# Patient Record
Sex: MALE | Race: BLACK OR AFRICAN AMERICAN | NOT HISPANIC OR LATINO | Employment: OTHER | URBAN - METROPOLITAN AREA
[De-identification: names, ages, dates, MRNs, and addresses within clinical notes are randomized per-mention and may not be internally consistent; named-entity substitution may affect disease eponyms.]

---

## 2017-07-25 ENCOUNTER — HISTORICAL (OUTPATIENT)
Dept: LAB | Facility: HOSPITAL | Age: 32
End: 2017-07-25

## 2017-07-25 LAB
EST. AVERAGE GLUCOSE BLD GHB EST-MCNC: 171 MG/DL
HBA1C MFR BLD: 7.6 % (ref 4.2–6.3)

## 2017-10-31 ENCOUNTER — HISTORICAL (OUTPATIENT)
Dept: ADMINISTRATIVE | Facility: HOSPITAL | Age: 32
End: 2017-10-31

## 2017-10-31 ENCOUNTER — HISTORICAL (OUTPATIENT)
Dept: FAMILY MEDICINE | Facility: CLINIC | Age: 32
End: 2017-10-31

## 2017-10-31 LAB
ABS NEUT (OLG): 8.3 X10(3)/MCL (ref 2.1–9.2)
BASOPHILS # BLD AUTO: 0.05 X10(3)/MCL
BASOPHILS NFR BLD AUTO: 0 % (ref 0–1)
BUN SERPL-MCNC: 14 MG/DL (ref 7–18)
CALCIUM SERPL-MCNC: 9.4 MG/DL (ref 8.5–10.1)
CHLORIDE SERPL-SCNC: 101 MMOL/L (ref 98–107)
CHOLEST SERPL-MCNC: 200 MG/DL
CHOLEST/HDLC SERPL: 4.8 {RATIO} (ref 0–5)
CO2 SERPL-SCNC: 28 MMOL/L (ref 21–32)
CREAT SERPL-MCNC: 1 MG/DL (ref 0.6–1.3)
CREAT UR-MCNC: 181 MG/DL
EOSINOPHIL # BLD AUTO: 0.37 10*3/UL
EOSINOPHIL NFR BLD AUTO: 3 % (ref 0–5)
ERYTHROCYTE [DISTWIDTH] IN BLOOD BY AUTOMATED COUNT: 13.4 % (ref 11.5–14.5)
EST. AVERAGE GLUCOSE BLD GHB EST-MCNC: 160 MG/DL
GLUCOSE SERPL-MCNC: 139 MG/DL (ref 74–106)
HBA1C MFR BLD: 7.2 % (ref 4.2–6.3)
HCT VFR BLD AUTO: 42.4 % (ref 40–51)
HDLC SERPL-MCNC: 42 MG/DL
HGB BLD-MCNC: 14.2 GM/DL (ref 13.5–17.5)
IMM GRANULOCYTES # BLD AUTO: 0.03 10*3/UL
IMM GRANULOCYTES NFR BLD AUTO: 0 %
LDLC SERPL CALC-MCNC: 135 MG/DL (ref 0–130)
LYMPHOCYTES # BLD AUTO: 2.17 X10(3)/MCL
LYMPHOCYTES NFR BLD AUTO: 18 % (ref 15–40)
MCH RBC QN AUTO: 26.2 PG (ref 26–34)
MCHC RBC AUTO-ENTMCNC: 33.5 GM/DL (ref 31–37)
MCV RBC AUTO: 78.2 FL (ref 80–100)
MICROALBUMIN UR-MCNC: 107 MG/L (ref 0–19)
MICROALBUMIN/CREAT RATIO PNL UR: 59.1 MCG/MG CR (ref 0–29)
MONOCYTES # BLD AUTO: 0.81 X10(3)/MCL
MONOCYTES NFR BLD AUTO: 7 % (ref 4–12)
NEUTROPHILS # BLD AUTO: 8.3 X10(3)/MCL
NEUTROPHILS NFR BLD AUTO: 71 X10(3)/MCL
PLATELET # BLD AUTO: 418 X10(3)/MCL (ref 130–400)
PMV BLD AUTO: 10.6 FL (ref 7.4–10.4)
POTASSIUM SERPL-SCNC: 4.2 MMOL/L (ref 3.5–5.1)
RBC # BLD AUTO: 5.42 X10(6)/MCL (ref 4.5–5.9)
SODIUM SERPL-SCNC: 138 MMOL/L (ref 136–145)
TRIGL SERPL-MCNC: 114 MG/DL
VLDLC SERPL CALC-MCNC: 23 MG/DL
WBC # SPEC AUTO: 11.7 X10(3)/MCL (ref 4.5–11)

## 2017-11-06 ENCOUNTER — HISTORICAL (OUTPATIENT)
Dept: SLEEP MEDICINE | Facility: HOSPITAL | Age: 32
End: 2017-11-06

## 2017-12-17 ENCOUNTER — HISTORICAL (OUTPATIENT)
Dept: SLEEP MEDICINE | Facility: HOSPITAL | Age: 32
End: 2017-12-17

## 2018-03-15 ENCOUNTER — HISTORICAL (OUTPATIENT)
Dept: ADMINISTRATIVE | Facility: HOSPITAL | Age: 33
End: 2018-03-15

## 2018-03-15 LAB
ABS NEUT (OLG): 4.98 X10(3)/MCL (ref 2.1–9.2)
ALBUMIN SERPL-MCNC: 4.1 GM/DL (ref 3.4–5)
ALBUMIN/GLOB SERPL: 1 RATIO (ref 1–2)
ALP SERPL-CCNC: 62 UNIT/L (ref 45–117)
ALT SERPL-CCNC: 43 UNIT/L (ref 12–78)
APPEARANCE, UA: CLEAR
AST SERPL-CCNC: 17 UNIT/L (ref 15–37)
BACTERIA #/AREA URNS AUTO: ABNORMAL /[HPF]
BASOPHILS # BLD AUTO: 0.04 X10(3)/MCL
BASOPHILS NFR BLD AUTO: 0 %
BILIRUB SERPL-MCNC: 0.3 MG/DL (ref 0.2–1)
BILIRUB UR QL STRIP: NEGATIVE
BILIRUBIN DIRECT+TOT PNL SERPL-MCNC: 0.1 MG/DL
BILIRUBIN DIRECT+TOT PNL SERPL-MCNC: 0.2 MG/DL
BUN SERPL-MCNC: 15 MG/DL (ref 7–18)
CALCIUM SERPL-MCNC: 9.3 MG/DL (ref 8.5–10.1)
CHLORIDE SERPL-SCNC: 105 MMOL/L (ref 98–107)
CO2 SERPL-SCNC: 29 MMOL/L (ref 21–32)
COLOR UR: NORMAL
CREAT SERPL-MCNC: 0.9 MG/DL (ref 0.6–1.3)
CREAT UR-MCNC: 117 MG/DL
EOSINOPHIL # BLD AUTO: 0.41 10*3/UL
EOSINOPHIL NFR BLD AUTO: 5 %
ERYTHROCYTE [DISTWIDTH] IN BLOOD BY AUTOMATED COUNT: 14.4 % (ref 11.5–14.5)
EST. AVERAGE GLUCOSE BLD GHB EST-MCNC: 148 MG/DL
GLOBULIN SER-MCNC: 3.9 GM/ML (ref 2.3–3.5)
GLUCOSE (UA): NORMAL
GLUCOSE SERPL-MCNC: 115 MG/DL (ref 74–106)
HBA1C MFR BLD: 6.8 % (ref 4.2–6.3)
HCT VFR BLD AUTO: 45.4 % (ref 40–51)
HGB BLD-MCNC: 14.6 GM/DL (ref 13.5–17.5)
HGB UR QL STRIP: NEGATIVE
HYALINE CASTS #/AREA URNS LPF: ABNORMAL /[LPF]
IMM GRANULOCYTES # BLD AUTO: 0.03 10*3/UL
IMM GRANULOCYTES NFR BLD AUTO: 0 %
KETONES UR QL STRIP: NEGATIVE
LEUKOCYTE ESTERASE UR QL STRIP: NEGATIVE
LYMPHOCYTES # BLD AUTO: 2.34 X10(3)/MCL
LYMPHOCYTES NFR BLD AUTO: 28 % (ref 13–40)
MCH RBC QN AUTO: 26.2 PG (ref 26–34)
MCHC RBC AUTO-ENTMCNC: 32.2 GM/DL (ref 31–37)
MCV RBC AUTO: 81.5 FL (ref 80–100)
MICROALBUMIN UR-MCNC: 8 MG/L (ref 0–19)
MICROALBUMIN/CREAT RATIO PNL UR: 6.8 MCG/MG CR (ref 0–29)
MONOCYTES # BLD AUTO: 0.5 X10(3)/MCL
MONOCYTES NFR BLD AUTO: 6 % (ref 4–12)
NEUTROPHILS # BLD AUTO: 4.98 X10(3)/MCL
NEUTROPHILS NFR BLD AUTO: 60 X10(3)/MCL
NITRITE UR QL STRIP: NEGATIVE
PH UR STRIP: 5.5 [PH] (ref 4.5–8)
PLATELET # BLD AUTO: 382 X10(3)/MCL (ref 130–400)
PMV BLD AUTO: 11.1 FL (ref 7.4–10.4)
POTASSIUM SERPL-SCNC: 4.2 MMOL/L (ref 3.5–5.1)
PROT SERPL-MCNC: 8 GM/DL (ref 6.4–8.2)
PROT UR QL STRIP: NEGATIVE
RBC # BLD AUTO: 5.57 X10(6)/MCL (ref 4.5–5.9)
RBC #/AREA URNS AUTO: ABNORMAL /[HPF]
SODIUM SERPL-SCNC: 141 MMOL/L (ref 136–145)
SP GR UR STRIP: 1.01 (ref 1–1.03)
SQUAMOUS #/AREA URNS LPF: ABNORMAL /[LPF]
UROBILINOGEN UR STRIP-ACNC: NORMAL
WBC # SPEC AUTO: 8.3 X10(3)/MCL (ref 4.5–11)
WBC #/AREA URNS AUTO: ABNORMAL /HPF

## 2018-09-19 ENCOUNTER — HISTORICAL (OUTPATIENT)
Dept: ADMINISTRATIVE | Facility: HOSPITAL | Age: 33
End: 2018-09-19

## 2018-09-19 LAB
ALBUMIN SERPL-MCNC: 4 GM/DL (ref 3.4–5)
ALBUMIN/GLOB SERPL: 1 RATIO (ref 1–2)
ALP SERPL-CCNC: 59 UNIT/L (ref 45–117)
ALT SERPL-CCNC: 32 UNIT/L (ref 12–78)
AST SERPL-CCNC: 14 UNIT/L (ref 15–37)
BILIRUB SERPL-MCNC: 0.4 MG/DL (ref 0.2–1)
BILIRUBIN DIRECT+TOT PNL SERPL-MCNC: 0.1 MG/DL
BILIRUBIN DIRECT+TOT PNL SERPL-MCNC: 0.3 MG/DL
BUN SERPL-MCNC: 15 MG/DL (ref 7–18)
CALCIUM SERPL-MCNC: 9.4 MG/DL (ref 8.5–10.1)
CHLORIDE SERPL-SCNC: 102 MMOL/L (ref 98–107)
CHOLEST SERPL-MCNC: 210 MG/DL
CHOLEST/HDLC SERPL: 5 {RATIO} (ref 0–5)
CO2 SERPL-SCNC: 31 MMOL/L (ref 21–32)
CREAT SERPL-MCNC: 0.9 MG/DL (ref 0.6–1.3)
CREAT UR-MCNC: 132 MG/DL
EST. AVERAGE GLUCOSE BLD GHB EST-MCNC: 160 MG/DL
GLOBULIN SER-MCNC: 3.9 GM/ML (ref 2.3–3.5)
GLUCOSE SERPL-MCNC: 121 MG/DL (ref 74–106)
HBA1C MFR BLD: 7.2 % (ref 4.2–6.3)
HDLC SERPL-MCNC: 42 MG/DL
LDLC SERPL CALC-MCNC: 137 MG/DL (ref 0–130)
MICROALBUMIN UR-MCNC: 21 MG/L (ref 0–19)
MICROALBUMIN/CREAT RATIO PNL UR: 15.9 MCG/MG CR (ref 0–29)
POTASSIUM SERPL-SCNC: 4.2 MMOL/L (ref 3.5–5.1)
PROT SERPL-MCNC: 7.9 GM/DL (ref 6.4–8.2)
SODIUM SERPL-SCNC: 139 MMOL/L (ref 136–145)
TRIGL SERPL-MCNC: 155 MG/DL
VLDLC SERPL CALC-MCNC: 31 MG/DL

## 2019-09-26 ENCOUNTER — HISTORICAL (OUTPATIENT)
Dept: ADMINISTRATIVE | Facility: HOSPITAL | Age: 34
End: 2019-09-26

## 2019-09-26 LAB
ABS NEUT (OLG): 4.66 X10(3)/MCL (ref 2.1–9.2)
ALBUMIN SERPL-MCNC: 4.1 GM/DL (ref 3.4–5)
ALBUMIN/GLOB SERPL: 1 RATIO (ref 1.1–2)
ALP SERPL-CCNC: 70 UNIT/L (ref 45–117)
ALT SERPL-CCNC: 47 UNIT/L (ref 12–78)
AST SERPL-CCNC: 22 UNIT/L (ref 15–37)
BASOPHILS # BLD AUTO: 0 X10(3)/MCL (ref 0–0.2)
BASOPHILS NFR BLD AUTO: 0 %
BILIRUB SERPL-MCNC: 0.3 MG/DL (ref 0.2–1)
BILIRUBIN DIRECT+TOT PNL SERPL-MCNC: 0.1 MG/DL (ref 0–0.2)
BILIRUBIN DIRECT+TOT PNL SERPL-MCNC: 0.2 MG/DL
BUN SERPL-MCNC: 13 MG/DL (ref 7–18)
CALCIUM SERPL-MCNC: 9.3 MG/DL (ref 8.5–10.1)
CHLORIDE SERPL-SCNC: 102 MMOL/L (ref 98–107)
CHOLEST SERPL-MCNC: 183 MG/DL
CHOLEST/HDLC SERPL: 4.3 {RATIO} (ref 0–5)
CO2 SERPL-SCNC: 31 MMOL/L (ref 21–32)
CREAT SERPL-MCNC: 0.9 MG/DL (ref 0.6–1.3)
CREAT UR-MCNC: 152 MG/DL
EOSINOPHIL # BLD AUTO: 0.4 X10(3)/MCL (ref 0–0.9)
EOSINOPHIL NFR BLD AUTO: 5 %
ERYTHROCYTE [DISTWIDTH] IN BLOOD BY AUTOMATED COUNT: 12.9 % (ref 11.5–14.5)
GLOBULIN SER-MCNC: 4.1 GM/ML (ref 2.3–3.5)
GLUCOSE SERPL-MCNC: 133 MG/DL (ref 74–106)
HCT VFR BLD AUTO: 44.1 % (ref 40–51)
HDLC SERPL-MCNC: 43 MG/DL (ref 40–59)
HGB BLD-MCNC: 14.2 GM/DL (ref 13.5–17.5)
IMM GRANULOCYTES # BLD AUTO: 0.04 10*3/UL
IMM GRANULOCYTES NFR BLD AUTO: 0 %
LDLC SERPL CALC-MCNC: 113 MG/DL
LYMPHOCYTES # BLD AUTO: 2.7 X10(3)/MCL (ref 0.6–4.6)
LYMPHOCYTES NFR BLD AUTO: 32 %
MCH RBC QN AUTO: 26.4 PG (ref 26–34)
MCHC RBC AUTO-ENTMCNC: 32.2 GM/DL (ref 31–37)
MCV RBC AUTO: 82 FL (ref 80–100)
MICROALBUMIN UR-MCNC: 17.1 MG/L (ref 0–19)
MICROALBUMIN/CREAT RATIO PNL UR: 11.2 MCG/MG CR (ref 0–29)
MONOCYTES # BLD AUTO: 0.6 X10(3)/MCL (ref 0.1–1.3)
MONOCYTES NFR BLD AUTO: 7 %
NEUTROPHILS # BLD AUTO: 4.66 X10(3)/MCL (ref 2.1–9.2)
NEUTROPHILS NFR BLD AUTO: 55 %
PLATELET # BLD AUTO: 466 X10(3)/MCL (ref 130–400)
PMV BLD AUTO: 10.6 FL (ref 7.4–10.4)
POTASSIUM SERPL-SCNC: 4.1 MMOL/L (ref 3.5–5.1)
PROT SERPL-MCNC: 8.2 GM/DL (ref 6.4–8.2)
RBC # BLD AUTO: 5.38 X10(6)/MCL (ref 4.5–5.9)
SODIUM SERPL-SCNC: 138 MMOL/L (ref 136–145)
TRIGL SERPL-MCNC: 134 MG/DL
URATE SERPL-MCNC: 9.4 MG/DL (ref 3.5–7.2)
VLDLC SERPL CALC-MCNC: 27 MG/DL
WBC # SPEC AUTO: 8.5 X10(3)/MCL (ref 4.5–11)

## 2020-05-25 ENCOUNTER — HISTORICAL (OUTPATIENT)
Dept: ADMINISTRATIVE | Facility: HOSPITAL | Age: 35
End: 2020-05-25

## 2020-05-25 LAB
CHOLEST SERPL-MCNC: 168 MG/DL
CHOLEST/HDLC SERPL: 4.2 {RATIO} (ref 0–5)
CREAT UR-MCNC: 292 MG/DL
HDLC SERPL-MCNC: 40 MG/DL (ref 40–59)
LDLC SERPL CALC-MCNC: 103 MG/DL
MICROALBUMIN UR-MCNC: 23.2 MG/L (ref 0–19)
MICROALBUMIN/CREAT RATIO PNL UR: 7.9 MCG/MG CR (ref 0–29)
TRIGL SERPL-MCNC: 124 MG/DL
URATE SERPL-MCNC: 7.6 MG/DL (ref 3.5–7.2)
VLDLC SERPL CALC-MCNC: 25 MG/DL

## 2021-04-30 ENCOUNTER — HISTORICAL (OUTPATIENT)
Dept: ADMINISTRATIVE | Facility: HOSPITAL | Age: 36
End: 2021-04-30

## 2021-04-30 LAB
ABS NEUT (OLG): 4.93 X10(3)/MCL (ref 2.1–9.2)
ALBUMIN SERPL-MCNC: 4 GM/DL (ref 3.5–5)
ALBUMIN/GLOB SERPL: 1.1 RATIO (ref 1.1–2)
ALP SERPL-CCNC: 69 UNIT/L (ref 40–150)
ALT SERPL-CCNC: 23 UNIT/L (ref 0–55)
AST SERPL-CCNC: 15 UNIT/L (ref 5–34)
BASOPHILS # BLD AUTO: 0 X10(3)/MCL (ref 0–0.2)
BASOPHILS NFR BLD AUTO: 0 %
BILIRUB SERPL-MCNC: 0.7 MG/DL
BILIRUBIN DIRECT+TOT PNL SERPL-MCNC: 0.2 MG/DL (ref 0–0.5)
BILIRUBIN DIRECT+TOT PNL SERPL-MCNC: 0.5 MG/DL (ref 0–0.8)
BUN SERPL-MCNC: 12.1 MG/DL (ref 8.9–20.6)
CALCIUM SERPL-MCNC: 10.1 MG/DL (ref 8.4–10.2)
CHLORIDE SERPL-SCNC: 100 MMOL/L (ref 98–107)
CHOLEST SERPL-MCNC: 195 MG/DL
CHOLEST/HDLC SERPL: 5 {RATIO} (ref 0–5)
CO2 SERPL-SCNC: 28 MMOL/L (ref 22–29)
CREAT SERPL-MCNC: 0.84 MG/DL (ref 0.73–1.18)
CREAT UR-MCNC: 124.5 MG/DL (ref 58–161)
EOSINOPHIL # BLD AUTO: 0.3 X10(3)/MCL (ref 0–0.9)
EOSINOPHIL NFR BLD AUTO: 3 %
ERYTHROCYTE [DISTWIDTH] IN BLOOD BY AUTOMATED COUNT: 13.1 % (ref 11.5–14.5)
EST. AVERAGE GLUCOSE BLD GHB EST-MCNC: 211.6 MG/DL
GLOBULIN SER-MCNC: 3.6 GM/DL (ref 2.4–3.5)
GLUCOSE SERPL-MCNC: 220 MG/DL (ref 74–100)
HBA1C MFR BLD: 9 %
HCT VFR BLD AUTO: 41.8 % (ref 40–51)
HDLC SERPL-MCNC: 39 MG/DL (ref 35–60)
HGB BLD-MCNC: 13.7 GM/DL (ref 13.5–17.5)
IMM GRANULOCYTES # BLD AUTO: 0.02 10*3/UL
IMM GRANULOCYTES NFR BLD AUTO: 0 %
LDLC SERPL CALC-MCNC: 110 MG/DL (ref 50–140)
LYMPHOCYTES # BLD AUTO: 2.6 X10(3)/MCL (ref 0.6–4.6)
LYMPHOCYTES NFR BLD AUTO: 30 %
MCH RBC QN AUTO: 26.4 PG (ref 26–34)
MCHC RBC AUTO-ENTMCNC: 32.8 GM/DL (ref 31–37)
MCV RBC AUTO: 80.7 FL (ref 80–100)
MICROALBUMIN UR-MCNC: 25.4 MG/L
MICROALBUMIN/CREAT RATIO PNL UR: 20.4 MG/GM CR (ref 0–30)
MONOCYTES # BLD AUTO: 0.7 X10(3)/MCL (ref 0.1–1.3)
MONOCYTES NFR BLD AUTO: 8 %
NEUTROPHILS # BLD AUTO: 4.93 X10(3)/MCL (ref 2.1–9.2)
NEUTROPHILS NFR BLD AUTO: 58 %
PLATELET # BLD AUTO: 322 X10(3)/MCL (ref 130–400)
PMV BLD AUTO: 10.8 FL (ref 7.4–10.4)
POTASSIUM SERPL-SCNC: 4.2 MMOL/L (ref 3.5–5.1)
PROT SERPL-MCNC: 7.6 GM/DL (ref 6.4–8.3)
RBC # BLD AUTO: 5.18 X10(6)/MCL (ref 4.5–5.9)
SODIUM SERPL-SCNC: 138 MMOL/L (ref 136–145)
TRIGL SERPL-MCNC: 228 MG/DL (ref 34–140)
TSH SERPL-ACNC: 1.18 UIU/ML (ref 0.35–4.94)
VLDLC SERPL CALC-MCNC: 46 MG/DL
WBC # SPEC AUTO: 8.5 X10(3)/MCL (ref 4.5–11)

## 2021-12-14 LAB
LEFT EYE DM RETINOPATHY: NEGATIVE
RIGHT EYE DM RETINOPATHY: NEGATIVE

## 2022-04-10 ENCOUNTER — HISTORICAL (OUTPATIENT)
Dept: ADMINISTRATIVE | Facility: HOSPITAL | Age: 37
End: 2022-04-10

## 2022-04-24 VITALS
DIASTOLIC BLOOD PRESSURE: 95 MMHG | HEIGHT: 73 IN | OXYGEN SATURATION: 99 % | WEIGHT: 315 LBS | BODY MASS INDEX: 41.75 KG/M2 | SYSTOLIC BLOOD PRESSURE: 129 MMHG

## 2022-05-03 NOTE — HISTORICAL [facility] CERNER
This is a historical note converted from Brigid. Formatting and pictures may have been removed.  Please reference Brigid for original formatting and attached multimedia. Chief Complaint  Wellness visit; requesting A1c; med refills  History of Present Illness  35 Years- old?Male?[facility]?routine follow up.  ?   Acute Issues:?none  ?  Chronic Issues:  Type 2 diabetes  -Last A1c 7.4?5/25/2020  -Intermittent compliance with Metformin and?Victoza secondary to not?being seen by the physician >1-year  -Has been losing weight  ?   Obesity  -Currently working out at a gym  -Watching diet  -Losing weight  ?   XIOMARA  -Compliant with CPAP  ?  Review of Systems  Constitutional:?no fevers, chills or fatigue  Respiratory:?no trouble breathing or shortness of breath  Cardiovascular:?no chest pain or tightness,?no shortness breath on activity,?no ankle swelling  Gastrointestinal:no constipation,?no diarrhea,?no nausea,?no vomiting  Musculoskeletal:?No muscle pain,?no joint pain  Integumentary: no rashes or breakdown  Neurologic: no dizziness, no fainting  Physical Exam  Vitals & Measurements  T:?36.7? ?C (Oral)? HR:?97(Peripheral)? RR:?20? BP:?122/84? SpO2:?97%?  HT:?185.00?cm? WT:?147.700?kg? BMI:?43.16?  General:?Alert and oriented,?no acute distress  Respiratory:?Lungs clear to auscultation bilaterally,?No increased work of breathing  Cardiovascular:?S1-S2, regular rate, regular rhythm,?2+ radial pulses,?No lower extremity edema  Gastrointestinal:?Obese, soft, nontender, nondistended  Musculoskeletal: Appropriate movement of extremities bilaterally  Integumentary: No rashes or breakdown noted  Neurologic: No focal motor defects noted  Psych: Calm, cooperative  Assessment/Plan  1.?DM (diabetes mellitus), type 2?E11.9  ?Refill sent  Check CBGs  Labs as below  Ordered:  Misc Prescription, Diabetic kit, See Instructions, Llancets for once daily testing Dx: E11.9, # 1 box(es), 11 Refill(s), Pharmacy: [facility], 185, cm, Height/Length Dosing, 04/30/21 14:10:00 CDT, 147.7, kg, Weight Dosing, 04/30/21 14:10:00 CDT  [facility] [place] [facility] Prescription, Victoza pen needles, See Instructions, Victoza pen needles Dx E11.9, # 1 box(es), 11 Refill(s), Pharmacy: [facility], 185, cm, Height/Length Dosing, 04/30/21 14:10:00 CDT, 147.7, kg, Weight Dosing, 04/30/21 14:10:00 CDT  CBC w/ Auto Diff, Routine collect, *Est. 04/30/21 3:00:00 CDT, Blood, Order for future visit, *Est. Stop date 04/30/21 3:00:00 CDT, Lab Collect, DM (diabetes mellitus), type 2, 04/30/21 14:22:00 CDT  Clinic Follow up, *Est. 07/30/21 3:00:00 CDT, dmII, Order for future visit, DM (diabetes mellitus), type 2  Morbid obesity  XIOMARA on CPAP, [facility]  Comprehensive Metabolic Panel, Routine collect, *Est. 04/30/21 3:00:00 CDT, Blood, Order for future visit, *Est. Stop date 04/30/21 3:00:00 CDT, Lab Collect, DM (diabetes mellitus), type 2, 04/30/21 14:22:00 CDT  Hemoglobin A1C [facility], Routine collect, *Est. 04/30/21 3:00:00 CDT, Blood, Order for future visit, *Est. Stop date 04/30/21 3:00:00 CDT, Lab Collect, DM (diabetes mellitus), type 2, 04/30/21 14:22:00 CDT  Lipid Panel, Routine collect, *Est. 04/30/21 3:00:00 CDT, Blood, Order for future visit, *Est. Stop date 04/30/21 3:00:00 CDT, Lab Collect, DM (diabetes mellitus), type 2, 04/30/21 14:22:00 CDT  Microalbum/Creatinine Ratio Urine (Microalb/Creat), Routine collect, Urine, Order for future visit, *Est. 04/30/21 3:00:00 CDT, *Est. Stop date 04/30/21 3:00:00 CDT, Nurse collect, DM (diabetes mellitus), type 2  Thyroid Stimulating Hormone, Routine collect, *Est. 04/30/21 3:00:00 CDT, Blood, Order for future visit, *Est. Stop date 04/30/21 3:00:00 CDT, Lab Collect, DM (diabetes mellitus), type 2, 04/30/21 14:22:00 CDT  ?  2.?Morbid obesity?E66.01  ?Continue weight loss  Ordered:  Clinic Follow up, *Est. 07/30/21 3:00:00 CDT, dmII, Order for future visit, DM (diabetes mellitus), type 2   Morbid obesity  XIOMARA on CPAP, [facility]  ?  3.?XIOMARA on CPAP?G47.33  ?Continue CPAP use  Ordered:  Clinic Follow up, *Est. 07/30/21 3:00:00 CDT, dmII, Order for future visit, DM (diabetes mellitus), type 2  Morbid obesity  XIOMARA on CPAP, [facility]  ?  Orders:  liraglutide, 1.2 mg, Subcutaneous, Daily, # 15 mL, 3 Refill(s), Pharmacy: [facility], 185, cm, Height/Length Dosing, 04/30/21 14:10:00 CDT, 147.7, kg, Weight Dosing, 04/30/21 14:10:00 CDT  metFORMIN, 1,000 mg = 1 tab(s), Oral, BID, # 60 tab(s), 5 Refill(s), Pharmacy: [facility], 185, cm, Height/Length Dosing, 04/30/21 14:10:00 CDT, 147.7, kg, Weight Dosing, 04/30/21 14:10:00 CDT  Misc Prescription, Presto Glucometer Test Strips, See Instructions, Presto Glucometer Test Strips Use once daily to check glucose in the morning prior to breakfast DX: Type II DM, # 1 EA, 11 Refill(s), Pharmacy: [facility], 185, cm, Height/Length...  Referrals  Clinic Follow up, *Est. 07/30/21 3:00:00 CDT, dmII, Order for future visit, DM (diabetes mellitus), type 2  Morbid obesity  XIOMARA on CPAP, [facility]   Problem List/Past Medical History  Ongoing  DM (diabetes mellitus), type 2  Morbid obesity  Obesity  XIOMARA on CPAP  Historical  Daytime somnolence  Diabetes  Procedure/Surgical History  Drainage of Right Upper Leg Skin, External Approach (12/21/2015)   Medications  Diabetic kit, See Instructions, 11 refills  EASY TOUCH TWIST 33G LANCETS EA, See Instructions, 10 refills  metFORMIN 1000 mg oral tablet, 1000 mg= 1 tab(s), Oral, BID, 5 refills  Presto Glucometer Test Strips, See Instructions, 11 refills  Victoza 18 mg/3 mL subcutaneous injection, 1.2 mg, Subcutaneous, Daily, 3 refills  Victoza pen needles, See Instructions, 11 refills  Allergies  No Known Allergies  Social History  Abuse/Neglect  No, No, Yes, 04/30/2021  Alcohol - Medium Risk, 12/19/2015  Current, Beer, Wine,  Liquor, 1-2 times per year, Alcohol use interferes with work or home: No. Others hurt by drinking: No. Household alcohol concerns: No., 05/25/2020  Employment/School  Employed, 09/26/2019  Exercise  Exercise duration: 30. Exercise frequency: 1-2 times/week. Exercise type: Walking., 09/26/2019  Home/Environment  Lives with Alone. Living situation: Home/Independent. Glucose monitoring, 09/26/2019  Nutrition/Health  Low carbohydrate, Good, 09/26/2019  Sexual  Sexually active: Yes. Number of current partners 1. Sexual orientation: Straight or heterosexual. Gender Identity Identifies as male., 09/26/2019  Spiritual/Cultural  Spritual, No, 09/26/2019  Substance Use - Denies Substance Abuse, 12/19/2015  Never, 09/26/2019  Tobacco - Denies Tobacco Use, 12/19/2015  Never (less than 100 in lifetime), No, 04/30/2021  Family History  Diabetes mellitus type 2: Mother and Father.  Immunizations  Vaccine Date Status   meningococcal conjugate vaccine 02/15/2006 Recorded   hepatitis B pediatric vaccine 03/24/1999 Recorded   hepatitis B pediatric vaccine 10/16/1998 Recorded   hepatitis B pediatric vaccine 09/11/1998 Recorded   poliovirus vaccine, inactivated 08/22/1991 Recorded   measles/mumps/rubella virus vaccine 08/22/1991 Recorded   diphtheria/pertussis, acel/tetanus ped 08/22/1991 Recorded   poliovirus vaccine, inactivated 06/16/1988 Recorded   diphtheria/pertussis, acel/tetanus ped 06/16/1988 Recorded   measles/mumps/rubella virus vaccine 04/11/1988 Recorded   diphtheria/pertussis, acel/tetanus ped 06/01/1987 Recorded   poliovirus vaccine, inactivated 03/12/1987 Recorded   diphtheria/pertussis, acel/tetanus ped 03/12/1987 Recorded   poliovirus vaccine, inactivated 1986 Recorded   diphtheria/pertussis, acel/tetanus ped 1986 Recorded       ?  I reviewed History, PE, A/P and chart was reviewed.  I agree with resident, care reasonable and appropriate.?  may be good candidate for bariatric surgery if interested

## 2022-05-03 NOTE — HISTORICAL [facility] CERNER
This is a historical note converted from Brigid. Formatting and pictures may have been removed.  Please reference Brigid for original formatting and attached multimedia. Chief Complaint  diabetic  History of Present Illness  32-year-old -American male with past medical history of morbid obesity, XIOMARA on CPAP type 2 diabetes presents [facility] for routine scheduled follow-ups.? Acute issues since last visit.? Admits to not strictly following diabetic diet over the past month especially with brothers recent wedding.? He just started walking again at the park and plans to walk daily to lose weight moving forward.? Has not been regularly checking sugars, or previously under 100 now readings are usually above 100.? Takes metformin and Victoza as prescribed.? Been seen by ophthalmologist since last visit was told exam was unremarkable and to follow-up in 1 year.? Checking feet daily, no ulcers or lesions.? Used CPAP last night, reports less daytime sleepiness and snoring with use of CPAP.  Review of Systems  Constitutional: No fever. No chills. No weakness. No fatigue. No decreased activity.?  Eye: No recent visual problem. No blurry vision.?  Respiratory: No shortness of breath.?  Cardiovascular: No chest pain. No palpitations. No edema.?  Gastrointestinal: No n/v/d. No constipation. No abdominal pain.?  Endocrine: No excessive hunger. No excessive thirst. No polyuria  Musculoskeletal: No muscle pain. No joint pain.?  Integumentary: No rashes. No lesions.?  Neurologic: No numbness. No tingling, No headache.?  Psychiatric: No depression. No anxiety. .?  Physical Exam  Vitals & Measurements  T:?36.8? ?C (Oral)? HR:?68(Peripheral)? RR:?18? BP:?139/89?  HT:?185?cm? HT:?185?cm? WT:?154.3?kg? WT:?154.3?kg? BMI:?45.08?  Gen: NAD, obese  HEENT: NCAT, PEERL, EOMI, MMM  Neck: supple, NT, no JVD  CV: RRR, no m/r/g, no edema  Resp: CTAB  Abd: soft, NT, ND, +BS  Ext: no swelling, no tenderness, no deformity  Feet: No  lesions, no ulcers, sensation intact, DP 2+ bilaterally, maceration between toes with dry scaling feet on soles  Neuro: A&Ox3, no focal deficits  Psych: cooperative, appropriate mood and affect?  Assessment/Plan  Type 2 diabetes  Morbid?obesity  XIOMARA  Last hemoglobin A1c?0.8 March 2018, repeat hemoglobin A1c today  Lipid panel today  Urine microalbumin today  CMP today  Cont metformin 1000 mg twice daily and Victoza  Monofilament exam completed March 2018  Seen by ophthalmologist May 2018, repeat May 2019  Check feet daily, Lamisil OTC daily for 2 weeks for tinea pedis, keep feet dry  Counseled on diabetic diet, and regular physical activity  Consult on compliance with CPAP, patient is benefiting from use  ?  RTC in 3 months with Dr. Medrano   Problem List/Past Medical History  Ongoing  Daytime somnolence  Diabetes  DM (diabetes mellitus), type 2  Morbid obesity  Obesity  XIOMARA on CPAP  Historical  No qualifying data  Procedure/Surgical History  Drainage of Right Upper Leg Skin, External Approach (12/21/2015)   Medications  Diabetic kit, See Instructions, 11 refills  instructions, See Instructions  metFORMIN 1000 mg oral tablet, 1000 mg= 1 tab(s), Oral, BID, 5 refills  Victoza 18 mg/3 mL subcutaneous injection, 1.2 mg, Subcutaneous, Daily, 5 refills  Victoza pen needles, See Instructions, 11 refills  Allergies  No Known Allergies  Social History  Alcohol - Medium Risk, 12/19/2015  Current, Liquor, 1-2 times per month, 09/19/2018  Substance Abuse - Denies Substance Abuse, 12/19/2015  Tobacco - Denies Tobacco Use, 12/19/2015  Never smoker Use:., 09/19/2018  Family History  Diabetes mellitus type 2: Mother and Father.  Health Maintenance  Health Maintenance  ???Pending?(in the next year)  ??? ??OverDue  ??? ? ? ?Smoking Cessation due??02/19/17??and every 1??year(s)  ??? ? ? ?Diabetes Maintenance-Eye Exam due??04/01/17??and every 1??year(s)  ??? ??Due?  ??? ? ? ?ADL Screening due??09/19/18??and every 1??year(s)  ??? ? ?  ?Alcohol Misuse Screening due??09/19/18??and every 1??year(s)  ??? ? ? ?Tetanus Vaccine due??09/19/18??and every 10??year(s)  ??? ??Due In Future?  ??? ? ? ?Diabetes Maintenance-Fasting Lipid Profile not due until??10/31/18??and every 1??year(s)  ??? ? ? ?Diabetes Maintenance-HgbA1c not due until??03/15/19??and every 1??year(s)  ??? ? ? ?Diabetes Maintenance-Microalbumin not due until??03/15/19??and every 1??year(s)  ??? ? ? ?Diabetes Maintenance-Urine Dipstick not due until??03/15/19??and every 1??year(s)  ??? ? ? ?Diabetes Maintenance-Serum Creatinine not due until??03/15/19??and every 1??year(s)  ??? ? ? ?Diabetes Maintenance-Foot Exam not due until??06/13/19??and every 1??year(s)  ???Satisfied?(in the past 1 year)  ??? ??Satisfied?  ??? ? ? ?Blood Pressure Screening on??09/19/18.??Satisfied by Sam Nobles LPN B.  ??? ? ? ?Body Mass Index Check on??09/19/18.??Satisfied by Alexis Nobles LPNe B.  ??? ? ? ?Depression Screening on??09/19/18.??Satisfied by Alexis Nobles LPNe B.  ??? ? ? ?Diabetes Maintenance-Foot Exam on??06/13/18.??Satisfied by Aamir Mckeon MD  ??? ? ? ?Diabetes Screening on??03/15/18.??Satisfied by Mena Estrada  ??? ? ? ?Influenza Vaccine on??09/19/18.??Satisfied by Kvng Nobles LPNmie B.  ??? ? ? ?Obesity Screening on??09/19/18.??Satisfied by Kvng Nobles LPNmie B.  ?  ?      history and physical reviewed, plan of care reviewed and i agree with that.

## 2022-05-03 NOTE — HISTORICAL [facility] CERNER
This is a historical note converted from Brigid. Formatting and pictures may have been removed.  Please reference Brigid for original formatting and attached multimedia. Chief Complaint  F/U on diabities  History of Present Illness  31 yo M with PMH of type II DM, XIOMARA and morbid obesity presents to [facility] for scheduled f/u. No new issues or concerns today  ?  DM: checking CBGs occasionally in AM 120s average, lowest 96, highest 200s. Not recording CBGs.?Skipped a few days of metformin due to glucose level of 100, was afraid it would drop to low. Has glucose tablets.?Also admits to occasionally missing doses of metformin and Victoza due to problems remembering taking medication. Diet is improving. Denies numbness and tingling in feet. Checking feet no ulcers or lesions, dry skin. Has not seen Ophthalmology in 2 years. No changes in vision.  ?  Obesity: Plans on starting walking today, not doing currently. Joined weight loss competitions. Is following diet and limiting portions, has cut out most carbs continues to lose weight from dieting alone.  ?  XIOMARA: Now has own CPAP machine, using on most nights when remembers. Decreased daytime sleepiness.  Review of Systems  Constitutional: No fever. No chills. No weakness. No fatigue. No decreased activity.?  Eye: No recent visual problem. No blurry vision.?  Respiratory: No shortness of breath.?  Cardiovascular: No chest pain. No palpitations. No edema.?  Gastrointestinal: No n/v/d. No constipation. No abdominal pain.?  Endocrine: No excessive hunger. No excessive thirst. No polyuria  Musculoskeletal: No muscle pain. No joint pain.?  Integumentary: No rashes. No lesions.?  Neurologic: No numbness. No tingling, No headache.?  Psychiatric: No depression. No anxiety  Physical Exam  Vitals & Measurements  T:?36.6? ?C (Oral)? HR:?78(Peripheral)? RR:?20? BP:?121/79? SpO2:?99%?  HT:?188?cm? HT:?188?cm? WT:?143?kg? WT:?143?kg? BMI:?40.46?  Gen: NAD, obese, dressed in gym  clothes  HEENT: NCAT, PEERL, EOMI, MMM  Neck: supple, NT, no JVD, no carotid bruits  CV: RRR, no m/r/g, no edema, DP 2+ symmetric  Resp: CTAB  Abd: soft, NT, ND, +BS  Feet: no ulcers or lesions, sensation intact  Ext: no swelling, no tenderness, no deformity  Neuro: A&Ox3, no focal deficits  Assessment/Plan  1.?DM (diabetes mellitus), type 2  - Internal referral to Ophthalmology for annual exam  - Well controlled, last HgbA1c 7.2 on 10/31/17, repeat today  - Counseled in depth on medication compliance, glucose logs, hypoglycemia signs and symptoms  - Glucose log provided, check fasting sugar in AM  - Continue current therapy  - Continue low fat, low carb diet with limited portions, recommend moderate to vigorous physical activity for 30-60 minutes per day at least 5 days per week  - See below for todays labs  Ordered:  CBC w/ Auto Diff, Routine collect, 03/15/18 9:48:00 CDT, Blood, Order for future visit, Stop date 03/15/18 9:48:00 CDT, Lab Collect, DM (diabetes mellitus), type 2, 03/15/18 9:48:00 CDT  Comprehensive Metabolic Panel, Routine collect, 03/15/18 9:48:00 CDT, Blood, Order for future visit, Stop date 03/15/18 9:48:00 CDT, Lab Collect, DM (diabetes mellitus), type 2, 03/15/18 9:48:00 CDT  Hemoglobin A1C [facility], Routine collect, 03/15/18 9:48:00 CDT, Blood, Order for future visit, Stop date 03/15/18 9:48:00 CDT, Lab Collect, DM (diabetes mellitus), type 2, 03/15/18 9:48:00 CDT  Microalbum/Creatinine Ratio Urine (Microalb/Creat), Routine collect, Urine, Order for future visit, 03/15/18 9:48:00 CDT, Stop date 03/15/18 9:48:00 CDT, Nurse collect, DM (diabetes mellitus), type 2  Urinalysis with Microscopic if Indicated, Routine collect, Urine, Order for future visit, 03/15/18 9:48:00 CDT, Stop date 03/15/18 9:48:00 CDT, Nurse collect, DM (diabetes mellitus), type 2, 03/15/18 9:48:00 CDT  ?  2.?Morbid obesity  - 7 lb weight loss since last visit, motivated to continue to lose weight  - See above recs  ?  3.?XIOMARA on  CPAP  - Counseled on compliance, use very night  ?  ED/WIC precautions discussed  F/U with Dr. Mckeon in 3 months  ?   Problem List/Past Medical History  Ongoing  Daytime somnolence  Diabetes  Obesity  Historical  No qualifying data  Procedure/Surgical History  Drainage of Right Upper Leg Skin, External Approach (12/21/2015).  Medications  Crutches, See Instructions,? ?Not taking  Diabetic kit, See Instructions, 11 refills  instructions, See Instructions  metFORMIN 1000 mg oral tablet, 1000 mg= 1 tab(s), Oral, BID, 5 refills  Victoza 18 mg/3 mL subcutaneous injection, 1.2 mg, Subcutaneous, Daily, 5 refills  Victoza pen needles, See Instructions, 11 refills  Allergies  No Known Allergies  Social History  Alcohol - Medium Risk, 12/19/2015  Current, Liquor, 1-2 times per week, 12/19/2015  Substance Abuse - Denies Substance Abuse, 12/19/2015  Tobacco - Denies Tobacco Use, 12/19/2015  Never smoker, 07/15/2016  Family History  Diabetes mellitus type 2: Mother and Father.      Pt discussed with resident. I have read the note and the management and plan are reasonable and appropriate.?  ?

## 2022-05-03 NOTE — HISTORICAL [facility]ER
This is a historical note converted from Brigid. Formatting and pictures may have been removed.  Please reference Brigid for original formatting and attached multimedia. Chief Complaint  F/U Diabetes  History of Present Illness  32 yo here for check up for DM  ?  ?  acute issues: recent gout flare onset 2 weeks ago in right knee, tx with colchicine. Doing better now, no sx as of today.  ?  chronic issues: DM; sugars have been running <120 when eating well. Has been as high as 155. States does miss medication sometimes. He notes that he has been trying to eat better and exercise more.  Review of Systems  Constitutional:?no fever, fatigue, weakness  Eye:?no vision changes  Respiratory:?no shortness of breath  Cardiovascular:?no chest pain, no palpitations, no edema  Gastrointestinal:?no nausea, vomiting, or diarrhea. No abdominal pain  Endocrine:?no heat or cold intolerance, no excessive thirst or excessive urination  Musculoskeletal:?no muscle or joint pain, no joint swelling  Integumentary:?no skin rash or abnormal lesion  Neurologic: no headache, no dizziness, no weakness or numbness  ?  Physical Exam  Vitals & Measurements  T:?36.8? ?C (Oral)? HR:?86(Peripheral)? RR:?18? BP:?129/87? SpO2:?96%?  HT:?186?cm? WT:?146.15?kg? BMI:?42.24?  General:?well-developed in no acute distress  Respiratory:?clear to auscultation bilaterally  Cardiovascular:?regular rate and rhythm without murmurs, gallops or rubs. 2+ DP. Good vascular flow by doppler.  Gastrointestinal:?soft, non-tender, non-distended with normal bowel sounds, without masses to palpation  Musculoskeletal:?full range of motion of all extremities/spine without limitation or discomfort  Integumentary: no rashes or skin lesions present  Neurologic:?AAOx3; normal sensation to BL feet.  ?  Assessment/Plan  DMII  Pt currently taking metformin 1000 mg BID, Victoza 18 m/3mL. Pts a1c is 7.0 today. He states that he has been trying to eat better, exercise more. Pts sugars  have been running 120-160, but notes that sometimes he forgets to take his medication. Diabetic foot exam wnl today. Will order Ur/Microalb/CMP to assess kidney function, CBC to check for macrocytosis 2/2 metformin use. Will write internal referral for?opthalmology fundoscopic?exam today.  ?   Gout  Pt with recent gout flare 2/2 eating, drinking to excess. Has rx for acute flare tx with Colchicine, however he does not have any uric acid maintenance medication. Will check uric acid today for baseline, induce allopurinol and titrate dosage based on level.  ?   HLD  Given the pts hx of DM, obesity, predispositions, will redraw lipid panel today. Will reassess at next visit and likely start statin at that time based on results.   Problem List/Past Medical History  Ongoing  Daytime somnolence  Diabetes  DM (diabetes mellitus), type 2  Morbid obesity  Obesity  XIOMARA on CPAP  Historical  No qualifying data  Procedure/Surgical History  Drainage of Right Upper Leg Skin, External Approach (12/21/2015)   Medications  colchicine 0.6 mg oral capsule, 0.6 mg= 1 cap(s), Oral, Daily  Diabetic kit, See Instructions, 11 refills  EASY TOUCH TWIST 33G LANCETS EA, See Instructions, 10 refills  metFORMIN 1000 mg oral tablet, 1000 mg= 1 tab(s), Oral, BID, 5 refills  Presto Glucometer Test Strips, See Instructions, 11 refills  Victoza 18 mg/3 mL subcutaneous injection, 1.2 mg, Subcutaneous, Daily, 5 refills  Victoza pen needles, See Instructions, 11 refills  Allergies  No Known Allergies  Social History  Abuse/Neglect  No, No, Yes, 09/26/2019  Alcohol - Medium Risk, 12/19/2015  Current, 1-2 times per year, Alcohol use interferes with work or home: No. Others hurt by drinking: No. Household alcohol concerns: No., 09/26/2019  Employment/School  Employed, 09/26/2019  Exercise  Exercise duration: 30. Exercise frequency: 1-2 times/week. Exercise type: Walking., 09/26/2019  Home/Environment  Lives with Alone. Living situation: Home/Independent.  Glucose monitoring, 09/26/2019  Nutrition/Health  Low carbohydrate, Good, 09/26/2019  Sexual  Sexually active: Yes. Number of current partners 1. Sexual orientation: Straight or heterosexual. Gender Identity Identifies as male., 09/26/2019  Spiritual/Cultural  Spritual, No, 09/26/2019  Substance Use - Denies Substance Abuse, 12/19/2015  Never, 09/26/2019  Tobacco - Denies Tobacco Use, 12/19/2015  Never (less than 100 in lifetime), N/A, 09/26/2019  Family History  Diabetes mellitus type 2: Mother and Father.  Health Maintenance  Health Maintenance  ???Pending?(in the next year)  ??? ??OverDue  ??? ? ? ?Diabetes Maintenance-Urine Dipstick due??03/15/19??and every 1??year(s)  ??? ? ? ?Diabetes Maintenance-Eye Exam due??06/04/19??and every 1??year(s)  ??? ? ? ?Diabetes Maintenance-Foot Exam due??06/13/19??and every 1??year(s)  ??? ? ? ?Diabetes Maintenance-Fasting Lipid Profile due??09/19/19??and every 1??year(s)  ??? ? ? ?Diabetes Maintenance-Serum Creatinine due??09/19/19??and every 1??year(s)  ??? ??Due?  ??? ? ? ?Diabetes Maintenance-Microalbumin due??09/19/19??and every 1??year(s)  ??? ? ? ?Influenza Vaccine due??09/26/19??and every?  ??? ? ? ?Tetanus Vaccine due??09/26/19??and every 10??year(s)  ??? ??Due In Future?  ??? ? ? ?Alcohol Misuse Screening not due until??01/01/20??and every 1??year(s)  ??? ? ? ?Obesity Screening not due until??01/01/20??and every 1??year(s)  ??? ? ? ?Diabetes Maintenance-HgbA1c not due until??03/25/20??and every 1??year(s)  ??? ? ? ?ADL Screening not due until??03/26/20??and every 1??year(s)  ??? ? ? ?Depression Screening not due until??06/23/20??and every 1??year(s)  ??? ? ? ?Blood Pressure Screening not due until??09/25/20??and every 1??year(s)  ??? ? ? ?Body Mass Index Check not due until??09/25/20??and every 1??year(s)  ???Satisfied?(in the past 1 year)  ??? ??Satisfied?  ??? ? ? ?ADL Screening on??03/26/19.??Satisfied by Darcy NICOLE, Rebecca Lindsay  ??? ? ? ?Alcohol Misuse Screening  on??06/24/19.??Satisfied by Darline Elizabeth LPN  ??? ? ? ?Blood Pressure Screening on??09/26/19.??Satisfied by Tiffany Ruff  ??? ? ? ?Body Mass Index Check on??09/26/19.??Satisfied by Tiffany Ruff  ??? ? ? ?Depression Screening on??06/24/19.??Satisfied by Darline Elizabeth LPN  ??? ? ? ?Diabetes Maintenance-HgbA1c on??03/26/19.??Satisfied by Mis Gutierrez LPN.  ??? ? ? ?Influenza Vaccine on??12/03/18.??Satisfied by Jaci Dye LPN  ??? ? ? ?Obesity Screening on??09/26/19.??Satisfied by Tiffany Ruff  ??? ? ? ?Smoking Cessation (Diabetes) on??12/03/18.??Satisfied by Jaci Dye LPN  ?      I have seen and examined the patient with the resident at the time of the appointment. The chart was reviewed. I agree with the assessment and plan. Care provided was reasonable and necessary.

## 2022-05-25 ENCOUNTER — OFFICE VISIT (OUTPATIENT)
Dept: FAMILY MEDICINE | Facility: CLINIC | Age: 37
End: 2022-05-25

## 2022-05-25 VITALS
WEIGHT: 315 LBS | HEIGHT: 73 IN | RESPIRATION RATE: 18 BRPM | SYSTOLIC BLOOD PRESSURE: 128 MMHG | HEART RATE: 93 BPM | DIASTOLIC BLOOD PRESSURE: 90 MMHG | BODY MASS INDEX: 41.75 KG/M2 | OXYGEN SATURATION: 99 % | TEMPERATURE: 98 F

## 2022-05-25 DIAGNOSIS — R03.0 ELEVATED BLOOD PRESSURE READING: Primary | ICD-10-CM

## 2022-05-25 PROBLEM — E66.01 MORBID OBESITY: Status: ACTIVE | Noted: 2022-05-25

## 2022-05-25 PROBLEM — G47.33 OBSTRUCTIVE SLEEP APNEA SYNDROME: Status: ACTIVE | Noted: 2022-05-25

## 2022-05-25 PROBLEM — E11.9 TYPE 2 DIABETES MELLITUS: Status: ACTIVE | Noted: 2022-05-25

## 2022-05-25 PROCEDURE — 99214 OFFICE O/P EST MOD 30 MIN: CPT | Mod: PBBFAC

## 2022-05-25 RX ORDER — LIRAGLUTIDE 6 MG/ML
1.2 INJECTION SUBCUTANEOUS DAILY
COMMUNITY
Start: 2022-05-10 | End: 2023-02-03 | Stop reason: SDUPTHER

## 2022-05-25 RX ORDER — METFORMIN HYDROCHLORIDE 1000 MG/1
1000 TABLET ORAL 2 TIMES DAILY
COMMUNITY
Start: 2022-02-02 | End: 2022-06-06 | Stop reason: SDUPTHER

## 2022-05-25 NOTE — PROGRESS NOTES
I have reviewed the Resident's history and physical, assessment, plan, and progress note. I agree with the findings.       Joselo Martinez MD  [facility]

## 2022-05-25 NOTE — PROGRESS NOTES
Chief Complaint  Hypertension (Blood pressure check)      History of Present Illness  Patient is a 36-year-old male with a past medical history of diabetes mellitus comes to the clinic for follow-up of elevated blood pressure reading in clinic.    Elevated blood pressure reading-patient reports that he had elevated blood pressure reading in the clinic previously.  His wife is a nurse and is able to keep a log of blood pressure along with himself.  Logs demonstrate systolic blood pressure ranging between 115-116 and diastolic blood pressure ranging between 70-80.  Patient is currently asymptomatic.  His blood pressure reading in the clinic is 128/90 (elevated diastolic blood pressure)      Diabetes mellitus type 2-patient is currently on Victoza and metformin.  Last A1c was 9.8 on 2/2/2022.  No proteinuria on 4/4/2022.     Review of Systems  -fever, -chills, -fatigue  -chest pain, -SOB  -n/v/d/c, -abdominal pain    Physical Exam  General: Patient is sitting in the chair, not in acute distress  CVS: RRR, no MRG, no peripheral edema  Chest: CTA bilaterally  Abdomen: soft, nontender, normal bowel sounds heard  Psych: calm and cooperative    Vitals:    05/25/22 0951   BP: (!) 128/90   Pulse: 93   Resp: 18   Temp: 97.9 °F (36.6 °C)     Wt Readings from Last 2 Encounters:   05/25/22 (!) 150.1 kg (331 lb)   08/23/21 (!) 150 kg (330 lb 11 oz)         Assessment / Plan:  Elevated blood pressure reading    DM type II      Elevated blood reading in clinic  Patient's blood pressure is elevated in the clinic (128/90).  Since the patient had a log of blood pressure only for 4 days, recommend the patient to maintain a blood pressure log for at least 2 weeks and bring it in the next clinic visit.  The patient does not have any proteinuria will not start patient on lisinopril.    Also recommended to continue lifestyle modification and lose at least 10% of the body weight, continue Mediterranean diet and physical activity of brisk  walking for 45 minutes at least 5 days in a week  Follow-up in 2 weeks with a log of blood pressure      Diabetes mellitus type 2  Continue Victoza and metformin as prescribed.    Recommend diet as above  Will need an A1c in the next clinic visit.      Nicolle Varela MD  [facility]    Portions of the record may have been created with voice recognition software. Occasional wrong-word or sound-a-like substitutions may have occurred due to the inherent limitations of voice recognition software. Read the chart carefully and recognize, using context, where substitutions have occurred.

## 2022-06-06 ENCOUNTER — OFFICE VISIT (OUTPATIENT)
Dept: FAMILY MEDICINE | Facility: CLINIC | Age: 37
End: 2022-06-06

## 2022-06-06 VITALS
HEART RATE: 76 BPM | WEIGHT: 312.19 LBS | HEIGHT: 72 IN | OXYGEN SATURATION: 100 % | TEMPERATURE: 98 F | DIASTOLIC BLOOD PRESSURE: 85 MMHG | SYSTOLIC BLOOD PRESSURE: 125 MMHG | BODY MASS INDEX: 42.29 KG/M2

## 2022-06-06 DIAGNOSIS — E11.9 TYPE 2 DIABETES MELLITUS WITHOUT COMPLICATION, WITHOUT LONG-TERM CURRENT USE OF INSULIN: ICD-10-CM

## 2022-06-06 DIAGNOSIS — Z01.30 BP CHECK: Primary | ICD-10-CM

## 2022-06-06 LAB — HBA1C MFR BLD: 9.7 %

## 2022-06-06 PROCEDURE — 83036 HEMOGLOBIN GLYCOSYLATED A1C: CPT | Mod: PBBFAC

## 2022-06-06 PROCEDURE — 99213 OFFICE O/P EST LOW 20 MIN: CPT | Mod: PBBFAC

## 2022-06-06 RX ORDER — METFORMIN HYDROCHLORIDE 1000 MG/1
1000 TABLET ORAL 2 TIMES DAILY
Qty: 180 TABLET | Refills: 0 | Status: SHIPPED | OUTPATIENT
Start: 2022-06-06 | End: 2022-08-08 | Stop reason: SDUPTHER

## 2022-06-06 RX ORDER — PEN NEEDLE, DIABETIC 32GX 5/32"
NEEDLE, DISPOSABLE MISCELLANEOUS
COMMUNITY
Start: 2022-02-02 | End: 2022-08-08

## 2022-06-06 NOTE — PROGRESS NOTES
[facility] Office Visit Note    CC:   F/U Elevated Blood pressure (C/o L arm,shoulder and knee pain (Gout))     HPI:  Gennaro Wild is a 36 y.o. male who presents for BP check.     Current Visit:   Home log provided. All readings < 140/90.   CBGs have been well-controlled with diet modification. Has only been taking metformin daily. Had some L shoulder and knee pain after eating steak for 2 days in a row, symptoms have since resolved after increased hydration. Avoiding red meat, now drinking more water. No further episodes. Eating more greens. Got gym membership to start exercising more, goal to be less than 300 lbs.    Review of Systems:   Review of Systems   Constitutional: Positive for weight loss (intentional). Negative for chills and fever.   HENT: Negative for congestion and sore throat.    Respiratory: Negative for cough and shortness of breath.    Cardiovascular: Negative for chest pain, palpitations and leg swelling.   Gastrointestinal: Negative for abdominal pain, constipation, diarrhea, nausea and vomiting.   Genitourinary: Negative for dysuria.   Musculoskeletal: Negative for falls, joint pain, myalgias and neck pain.   Skin: Negative for itching and rash.   Neurological: Negative for dizziness, focal weakness and headaches.        Current Outpatient Medications   Medication Instructions    metFORMIN (GLUCOPHAGE) 1,000 mg, Oral, Daily    VICTOZA 2-DARYL 1.2 mg, Subcutaneous, Daily        Physical Exam:   Vitals:    06/06/22 0959   BP: 125/85   Pulse: 76   Temp: 98.2 °F (36.8 °C)      Physical Exam   Constitutional: No distress.   Cardiovascular: Normal rate, regular rhythm, normal heart sounds and normal pulses.   Pulmonary/Chest: Effort normal and breath sounds normal.   Abdominal: Soft. Bowel sounds are normal. He exhibits no distension. There is no abdominal tenderness.   Musculoskeletal:         General: No swelling or tenderness. Normal range of motion.   Neurological: He is alert.   Skin:  Skin is warm and dry. Capillary refill takes less than 2 seconds. No rash noted.        Assessment/Plan:  1. BP check    2. Type 2 diabetes mellitus without complication, without long-term current use of insulin        -BP log reviewed, all readings at goal. BP at goal on triage. No changes to medication regimen at this time.   -POC A1c ordered today. Last A1c 9.8% on 2/2/2022. A1c today 9.7%. Instructed to increase metformin to BID dosing.     Return to clinic in 2 months with PCP    Sriram Delvalle M.D. [facility]

## 2022-07-24 ENCOUNTER — HOSPITAL ENCOUNTER (EMERGENCY)
Facility: HOSPITAL | Age: 37
Discharge: HOME | End: 2022-07-24
Attending: EMERGENCY MEDICINE

## 2022-07-24 VITALS
HEART RATE: 94 BPM | RESPIRATION RATE: 20 BRPM | TEMPERATURE: 99 F | BODY MASS INDEX: 39.9 KG/M2 | SYSTOLIC BLOOD PRESSURE: 148 MMHG | HEIGHT: 74 IN | DIASTOLIC BLOOD PRESSURE: 97 MMHG | WEIGHT: 310.88 LBS | OXYGEN SATURATION: 97 %

## 2022-07-24 DIAGNOSIS — S43.035A CLOSED INFERIOR DISLOCATION OF LEFT SHOULDER, INITIAL ENCOUNTER: Primary | ICD-10-CM

## 2022-07-24 PROCEDURE — 25000003 PHARM REV CODE 250: Performed by: PHYSICIAN ASSISTANT

## 2022-07-24 PROCEDURE — 96374 THER/PROPH/DIAG INJ IV PUSH: CPT | Mod: 59

## 2022-07-24 PROCEDURE — 63600175 PHARM REV CODE 636 W HCPCS: Performed by: EMERGENCY MEDICINE

## 2022-07-24 PROCEDURE — 20611 DRAIN/INJ JOINT/BURSA W/US: CPT | Mod: 59,LT

## 2022-07-24 PROCEDURE — 23650 CLTX SHO DSLC W/MNPJ WO ANES: CPT

## 2022-07-24 PROCEDURE — 99152 MOD SED SAME PHYS/QHP 5/>YRS: CPT | Mod: 59

## 2022-07-24 PROCEDURE — 99285 EMERGENCY DEPT VISIT HI MDM: CPT | Mod: 25

## 2022-07-24 PROCEDURE — 96375 TX/PRO/DX INJ NEW DRUG ADDON: CPT | Mod: 59

## 2022-07-24 RX ORDER — LIDOCAINE HYDROCHLORIDE 20 MG/ML
5 INJECTION, SOLUTION EPIDURAL; INFILTRATION; INTRACAUDAL; PERINEURAL
Status: COMPLETED | OUTPATIENT
Start: 2022-07-24 | End: 2022-07-24

## 2022-07-24 RX ORDER — KETOROLAC TROMETHAMINE 30 MG/ML
30 INJECTION, SOLUTION INTRAMUSCULAR; INTRAVENOUS
Status: COMPLETED | OUTPATIENT
Start: 2022-07-24 | End: 2022-07-24

## 2022-07-24 RX ORDER — HYDROCODONE BITARTRATE AND ACETAMINOPHEN 10; 325 MG/1; MG/1
1 TABLET ORAL EVERY 4 HOURS PRN
Qty: 18 TABLET | Refills: 0 | Status: SHIPPED | OUTPATIENT
Start: 2022-07-24

## 2022-07-24 RX ORDER — HYDROMORPHONE HYDROCHLORIDE 2 MG/ML
1 INJECTION, SOLUTION INTRAMUSCULAR; INTRAVENOUS; SUBCUTANEOUS
Status: COMPLETED | OUTPATIENT
Start: 2022-07-24 | End: 2022-07-24

## 2022-07-24 RX ORDER — PROPOFOL 10 MG/ML
50 VIAL (ML) INTRAVENOUS ONCE
Status: COMPLETED | OUTPATIENT
Start: 2022-07-24 | End: 2022-07-24

## 2022-07-24 RX ORDER — CYCLOBENZAPRINE HCL 10 MG
10 TABLET ORAL 3 TIMES DAILY PRN
Qty: 30 TABLET | Refills: 0 | Status: SHIPPED | OUTPATIENT
Start: 2022-07-24 | End: 2022-08-03

## 2022-07-24 RX ORDER — PROPOFOL 10 MG/ML
150 VIAL (ML) INTRAVENOUS ONCE
Status: COMPLETED | OUTPATIENT
Start: 2022-07-24 | End: 2022-07-24

## 2022-07-24 RX ORDER — ONDANSETRON 2 MG/ML
4 INJECTION INTRAMUSCULAR; INTRAVENOUS
Status: COMPLETED | OUTPATIENT
Start: 2022-07-24 | End: 2022-07-24

## 2022-07-24 RX ADMIN — ONDANSETRON HYDROCHLORIDE 4 MG: 2 SOLUTION INTRAMUSCULAR; INTRAVENOUS at 08:07

## 2022-07-24 RX ADMIN — LIDOCAINE HYDROCHLORIDE 100 MG: 20 INJECTION, SOLUTION EPIDURAL; INFILTRATION; INTRACAUDAL; PERINEURAL at 05:07

## 2022-07-24 RX ADMIN — HYDROMORPHONE HYDROCHLORIDE 1 MG: 2 INJECTION, SOLUTION INTRAMUSCULAR; INTRAVENOUS; SUBCUTANEOUS at 08:07

## 2022-07-24 RX ADMIN — KETOROLAC TROMETHAMINE 30 MG: 30 INJECTION, SOLUTION INTRAMUSCULAR at 08:07

## 2022-07-24 RX ADMIN — PROPOFOL 50 MG: 10 INJECTION, EMULSION INTRAVENOUS at 07:07

## 2022-07-24 RX ADMIN — PROPOFOL 150 MG: 10 INJECTION, EMULSION INTRAVENOUS at 07:07

## 2022-07-24 NOTE — ED PROVIDER NOTES
Encounter Date: 7/24/2022       History     Chief Complaint   Patient presents with    Shoulder Pain     C/o shoulder pain for 2 weeks. Denies acute injury. Clinic took x ray today and reports subluxation     Patient presents with left shoulder pain. Woke up with it extended above his head, hurting about 4 weeks ago, and continued to get worse moreso once they were on a cruise which they just return for. Went to [facility] who referred them to [facility] ED for what they read as posterior dislocation. Hasn't been able to lift it in 4 weeks forward or to the side without severe pain. No prior history of this. Last ate or drank at 11pm.  No prior issues with anesthesia. No family hx of anesthesia issues.  He is a DM but has otherwise been in his normal state of health. Rates pain as severe.    The history is provided by the patient and a relative.   Shoulder Pain  This is a new (Severe) problem. Episode onset: 4 weeks ago. The problem occurs constantly. The problem has not changed since onset.Pertinent negatives include no chest pain and no shortness of breath. Exacerbated by: movement. The treatment provided no relief.     Review of patient's allergies indicates:  No Known Allergies  No past medical history on file. PMH: DM  No past surgical history on file.  No family history on file.  Social History     Tobacco Use    Smoking status: Never Smoker    Smokeless tobacco: Never Used     Review of Systems   Constitutional: Negative for fever.   HENT: Negative for sore throat.    Respiratory: Negative for shortness of breath.    Cardiovascular: Negative for chest pain.   Gastrointestinal: Negative for nausea.   Musculoskeletal: Positive for joint swelling. Negative for back pain.        Joint pain   Skin: Negative for rash.   Neurological: Negative for weakness and numbness.   Hematological: Does not bruise/bleed easily.   All other systems reviewed and are negative.      Physical Exam     Initial Vitals [07/24/22  1557]   BP Pulse Resp Temp SpO2   (!) 150/101 101 18 98.5 °F (36.9 °C) 100 %      MAP       --         Physical Exam    Nursing note and vitals reviewed.  Constitutional: He appears well-developed and well-nourished. He is not diaphoretic. No distress.   HENT:   Head: Normocephalic and atraumatic.   Eyes: Conjunctivae are normal.   Neck: Neck supple.   Cardiovascular: Normal rate, regular rhythm and normal heart sounds.   Pulmonary/Chest: Breath sounds normal. No respiratory distress. He has no wheezes. He has no rhonchi.   Musculoskeletal:      Cervical back: Neck supple.      Comments: LUE is neurovascularly intact. Sensation and cap refill WNL. There is swelling to the L shoulder and ttp. ROM is extremely limited with inability to flex or abduct arm. No warmth over left shoulder to palpation     Neurological: He is alert and oriented to person, place, and time.   Skin: Skin is warm and dry.   Psychiatric: He has a normal mood and affect. Thought content normal.         ED Course   Joint Injection    Date/Time: 7/24/2022 5:29 PM  Location procedure was performed: [facility] EMERGENCY DEPARTMENT  Performed by: Shannon Hernandez MD  Authorized by: Shannon Hernandez MD   Pre-op diagnosis: Inferior left shoulder dislocation with joint effusion  Post-op diagnosis: Same  Indications: pain   Body area: shoulder  Joint: left shoulder  Local anesthesia used: yes  Anesthesia: local infiltration    Anesthesia:  Local anesthesia used: yes  Local Anesthetic: lidocaine 2% without epinephrine  Anesthetic total: 1 mL    Patient sedated: no  Description of findings: Skin was cleansed using iodine. Using sterile technique, 1 cc lidocaine was infiltrated into the skin followed by 9cc into the left glenohumeral joint using US guidance   Preparation: Patient was prepped and draped in the usual sterile fashion.  Needle size: 18 G  Approach: anterior  Patient tolerance: Patient tolerated the procedure well with no immediate  "complications    Procedural Sedation        Date/Time: 7/24/2022 9:47 PM  Performed by: Shannon Hernandez MD  Authorized by: Shannon Hernandez MD   Consent Done: Yes  Consent: Verbal consent obtained. Written consent obtained.  Risks and benefits: risks, benefits and alternatives were discussed  Consent given by: patient (Wife was also present for discussion)  Patient understanding: patient states understanding of the procedure being performed  Patient identity confirmed: name and verbally with patient  Time out: Immediately prior to procedure a "time out" was called to verify the correct patient, procedure, equipment, support staff and site/side marked as required.  ASA Class: Class 2 - Mild Illness without functional impairment.NPO STATUS:  Date/Time of last solid: 7/24/2022 11:00 AM    Equipment: on cardiac monitor., on CO2 monitor., on BP monitor., on supplemental oxygen., airway equipment available. and suction available.     Sedation type: moderate (conscious) sedation    Sedatives: propofol  Vital signs monitored during sedation: See Nursing notes for detail of sedation.  Complications: No complications.   Comments: Procedural sedation using total of 200mg Propofol done for shoulder reduction. Shoulder reduction attempted using traction - counter traction without success.  Patient/Family history of anesthesia or sedation complications: No      Labs Reviewed - No data to display       Imaging Results          CT Shoulder Without Contrast Left (Final result)  Result time 07/25/22 11:37:07    Final result by Kenyetta Ziegler MD (07/25/22 11:37:07)                 Impression:    Impression:    1. There is slight caudal migration of the head of the humerus in relation to the glenoid fossa with widening of the acromiohumeral interval (1.7 cm) consistent withinferior subluxation of the glenohumeral joint. There is soft tissue prominence in the left glenohumeral joint space suggestive of hemarthrosis. Correlate " clinically as regards additional evaluation and follow-up possibly with MRI.    2. There is slight deformity at the posterosuperior aspect of the head of the humerus (series 3 image 47) which is suggestive of Hillsachs defect.    3. Other details and findings as discussed above.    I concur with the preliminary report      Electronically signed by: Kenyetta Ziegler  Date:    07/25/2022  Time:    11:37             Narrative:    EXAMINATION:  CT SHOULDER WITHOUT CONTRAST LEFT    Technique: CT of the left shoulder was performed with intravenous contrast with direct axial as well as sagittal and coronal reformations.    Comparison: None.    Clinical history: Lt shoulder fx, post reduction.    Findings:    Shoulder:    Clavicle: Unremarkable.    Acromioclavicular Joint: Intact.    Scapula: Intact.    Humerus: There is slight deformity at the posterosuperior aspect of the head of the humerus (series 3 image 47) which is suggestive of Hillsachs defect.    Shoulder joint: There is slight caudal migration of the head of the humerus in relation to the glenoid fossa with widening of the acromiohumeral interval (1.7 cm) consistent withinferior subluxation of the glenohumeral joint. There is soft tissue prominence in the left glenohumeral joint space suggestive of hemarthrosis.    Upper arm: The visualized structures of the upper arm appear unremarkable.                    Preliminary result by Kenyetta Ziegler MD (07/24/22 20:51:37)                 Narrative:    START OF REPORT:  Technique: CT of the left shoulder was performed with intravenous contrast with direct axial as well as sagittal and coronal reformations.    Comparison: None.    Clinical history: Lt shoulder fx, post reduction.    Findings:  Shoulder:  Clavicle: Unremarkable.  Acromioclavicular Joint: Intact.  Scapula: Intact.  Humerus: There is slight deformity at the posterosuperior aspect of the head of the humerus (series 3 image 47) which is suggestive  of Hillsachs defect.  Shoulder joint: There is slight caudal migration of the head of the humerus in relation to the glenoid fossa with widening of the acromiohumeral interval (1.7 cm) consistent withinferior subluxation of the glenohumeral joint. There is soft tissue prominence in the left glenohumeral joint space suggestive of hemarthrosis.  Upper arm: The visualized structures of the upper arm appear unremarkable.      Impression:  1. There is slight caudal migration of the head of the humerus in relation to the glenoid fossa with widening of the acromiohumeral interval (1.7 cm) consistent withinferior subluxation of the glenohumeral joint. There is soft tissue prominence in the left glenohumeral joint space suggestive of hemarthrosis. Correlate clinically as regards additional evaluation and follow-up possibly with MRI.  2. There is slight deformity at the posterosuperior aspect of the head of the humerus (series 3 image 47) which is suggestive of Hillsachs defect.  3. Other details and findings as discussed above.                                 X-Ray Shoulder 2 or More Views Left (Final result)  Result time 07/25/22 12:30:04    Final result by Geo Deleon MD (07/25/22 12:30:04)                 Impression:      Inferior subluxation of the humeral head.      Electronically signed by: Geo Deleon  Date:    07/25/2022  Time:    12:30             Narrative:    EXAMINATION:  XR SHOULDER COMPLETE 2 OR MORE VIEWS LEFT    CLINICAL HISTORY:  post-reduction attempt;    TECHNIQUE:  Two or three views of the left shoulder.    COMPARISON:  Radiography earlier 07/24/2022    FINDINGS:  Evaluation is mildly limited by positioning, but the humeral head remains inferiorly subluxed relative to the glenoid.                               X-Ray Shoulder 2 or More Views Left (Final result)  Result time 07/24/22 16:48:36    Final result by Kenyetta Ziegler MD (07/24/22 16:48:36)                 Impression:      Inferior  subluxation of the left humerus with respect to the glenoid most likely due to a large left shoulder joint effusion.      Electronically signed by: Kenyetta Taylorsahil  Date:    07/24/2022  Time:    16:48             Narrative:    EXAMINATION:  XR SHOULDER COMPLETE 2 OR MORE VIEWS LEFT    CLINICAL HISTORY:  shoulder pain;    TECHNIQUE:  Two or three views of the left shoulder were performed.    COMPARISON:  None    FINDINGS:  There is inferior subluxation of the left humerus with respect to the glenoid.  There is no fracture seen but there is diffuse soft tissue swelling in the shoulder and there appears to be a left shoulder joint effusion.                                 Medications   LIDOcaine (PF) 20 mg/mL (2%) injection 100 mg (100 mg Infiltration Given by Provider 7/24/22 1715)   propofol (DIPRIVAN) 10 mg/mL IVP (150 mg Intravenous Given 7/24/22 1918)   propofol (DIPRIVAN) 10 mg/mL IVP (50 mg Intravenous Given by Provider 7/24/22 1923)   HYDROmorphone (PF) injection 1 mg (1 mg Intravenous Given 7/24/22 2028)   ondansetron injection 4 mg (4 mg Intravenous Given 7/24/22 2030)   ketorolac injection 30 mg (30 mg Intravenous Given 7/24/22 2030)     ED Course as of 07/25/22 2234   Sun Jul 24, 2022 1810 Due to patient's comorbidities and hx of sleep apnea, discussed options of intra-articular injection and reduction vs conscious sedation and reduction. After discussion of risk benefits, patient opts for injection and bedside awake reduction attempt.     After intra-articular injection of lidocaine, attempted awake reduction with manipulation without success.  [GM]   1818 Dr. Arnold with Orthopedics paged [GM]   1825 Case discussed with Dr. Arnold who reviewed imaging and recommended moving forward with conscious sedation with attempt of reduction though the prolonged course makes less likely to be successful. I discussed conscious sedation and attempted reduction with the patient and the patient's wife. Also  discussed that could be due to ligament injury and not amenable to reduction though there was no particular trauma. They were made aware that the likelihood of successful reduction was decreased due to these factors. The alternative would be to sling and discharge with pain medication and muscle relaxers with plan for close follow up with Dr. Arnold. The patient wishes to move forward with the procedure.  []   2014 2014 This is Dr Ev Lau and I assumed care of Mr Wild at 7pm.  He is a 36 year old male who has had left shoulder pain and reduced movement for 4 weeks.  Xray showed subluxation and Dr Arnold recommended consious sedation and reduction attempt.  Dr Hernandez performed sedation with Propofol and reduction attempt with my assistance.  No pop was felt and it did not seem to be successful clinically.  Repeat Xray is concerning for a humeral head fracture vs Hill-sachs deformity so I paged Dr Velasquez back who reviewed the images and recommends a CT of his shoulder and he will see him in clinic on Monday or Wednesday.  Pt is neurovascular intact currently in a sling.   [SH]   []   2014 2014 This is Dr Ev Lau and I assumed care of Mr Wild at 7pm.  He is a 36 year old male who has had left shoulder pain and reduced movement for 4 weeks.  Xray showed subluxation and Dr Arnold recommended consious sedation and reduction attempt.  Dr Hernandez performed sedation with Propofol and reduction attempt with my assistance.  No pop was felt and it was unsuccessful.  Repeat Xray is concerning for a humeral head fracture or Hill-Sachs Deformity so I paged Dr Velasquez back who reviewed the images and recommends a CT of his shoulder and he will see him in clinic on Monday or Wednesday.  Pt is neurovascular intact currently in a sling.   [SH]   [SH]   2014 This is Dr Ev Lau and I assumed care of Mr Wild at 7pm.  He is a 36 year old male who has had left shoulder pain and reduced  movement for 4 weeks.  Xray showed subluxation and Dr Arnold recommended consious sedation and reduction attempt.  Dr Hernandez performed sedation with Propofol and reduction attempt with my assistance.  No pop was felt and it was unsuccessful.  Repeat Xray is concerning for a humeral head fracture so I paged Dr Velasquez back who reviewed the images and recommends a CT of his shoulder and he will see him in clinic on Monday or Wednesday.  Pt is neurovascular intact currently in a sling.   [SH]      ED Course User Index  [GM] Shannon Hernandez MD  [SH] Ev Lau MD                  7- 2200    CT was read as Hill-sachs deformity consistent with prior dislocation and currently a shoulder subluxation, not dislocation.  Pt is being discharged in a sling with prn pain medication, pain medication instructions, and instructions fo follow up with Dr Arnold.  All questions were answered and he is stable for discharge having completely recovered from the sedation.                  Clinical Impression:   Final diagnoses:  [S43.035A] Closed inferior dislocation of left shoulder, initial encounter (Primary)          ED Disposition Condition    Discharge Stable        ED Prescriptions     Medication Sig Dispense Start Date End Date Auth. Provider    HYDROcodone-acetaminophen (NORCO)  mg per tablet Take 1 tablet by mouth every 4 (four) hours as needed for Pain. 18 tablet 7/24/2022  Shannon Hernandez MD    cyclobenzaprine (FLEXERIL) 10 MG tablet Take 1 tablet (10 mg total) by mouth 3 (three) times daily as needed for Muscle spasms. 30 tablet 7/24/2022 8/3/2022 Shannon Hernandez MD        Follow-up Information     Follow up With Specialties Details Why Contact Info    Issa Arnold MD Orthopedic Surgery Call  tomorrow to make follow up appointment with Dr. Arnold [address]  [address]  [facility] [address]  [phone]             Shannon Hernandez MD  07/25/22 1680

## 2022-07-24 NOTE — ED NOTES
Pt states that 4 weeks ago he woke up with his left arm raised over head, pt states his arm was a little swollen, pt states he could not raise his head over his shoulder. Pt was seen at urgent care facility names [facility] [place], pt had Xray that showed left shoulder was dislocated.

## 2022-08-01 ENCOUNTER — HOSPITAL ENCOUNTER (OUTPATIENT)
Dept: RADIOLOGY | Facility: CLINIC | Age: 37
Discharge: HOME | End: 2022-08-01
Attending: REHABILITATION UNIT

## 2022-08-01 ENCOUNTER — OFFICE VISIT (OUTPATIENT)
Dept: ORTHOPEDICS | Facility: CLINIC | Age: 37
End: 2022-08-01

## 2022-08-01 VITALS — WEIGHT: 310 LBS | BODY MASS INDEX: 39.78 KG/M2 | HEIGHT: 74 IN

## 2022-08-01 DIAGNOSIS — M25.512 LEFT SHOULDER PAIN, UNSPECIFIED CHRONICITY: ICD-10-CM

## 2022-08-01 DIAGNOSIS — S43.032A: Primary | ICD-10-CM

## 2022-08-01 PROCEDURE — 73030 XR SHOULDER COMPLETE 2 OR MORE VIEWS LEFT: ICD-10-PCS | Mod: LT,,, | Performed by: REHABILITATION UNIT

## 2022-08-01 PROCEDURE — 73030 X-RAY EXAM OF SHOULDER: CPT | Mod: LT,,, | Performed by: REHABILITATION UNIT

## 2022-08-01 PROCEDURE — 99203 PR OFFICE/OUTPT VISIT, NEW, LEVL III, 30-44 MIN: ICD-10-PCS | Mod: ,,, | Performed by: REHABILITATION UNIT

## 2022-08-01 PROCEDURE — 99203 OFFICE O/P NEW LOW 30 MIN: CPT | Mod: ,,, | Performed by: REHABILITATION UNIT

## 2022-08-01 NOTE — PROGRESS NOTES
Subjective:      Patient ID: Gennaro Wild is a 36 y.o. male.    Chief Complaint: Injury (Lt shoulder dislocation post er visit, pt states he woke up couple weeks ago with arm above his shoulder, pt states he has been taking a muscle relaxer and pain medicine prescribed by er, pt states pain is constant and not able to sleep at night, )    HPI:   Gennaro Wild is a 36 year old RHD male who presents today for initial evaluation of his left shoulder. He is accompanied by his mother. He reports that he woke up in the middle of June with his left shoulder extended over his head. He has had pain since then. He thought he had a gout flare. He went on a cruise and pain progressed over that time as well. He tried using a sling for some relief. He went to an urgent care clinic and then to [facility] ED on 7/24. Closed reduction was attempted, but patient had persistent subluxation. He was placed into a sling and referred for orthopaedic care. PSH significant for L shoulder arthroscopic surgery around 2003 in [place]. They are not sure of the exact surgery, but maybe rotator cuff. He went to rehab and recovered well other than some residual loss of motion specifically external rotation. He denies any dislocations or subluxations. His mother thinks that he might have had a dislocation event.     PMH significant for DM with most recent Hgb A1c of 9.0. Patient also has gout.     Past Medical History:   Diagnosis Date    Diabetes mellitus, type 2      Past Surgical History:   Procedure Laterality Date    ARTHROSCOPIC REPAIR OF ROTATOR CUFF OF SHOULDER       Social History     Socioeconomic History    Marital status:    Tobacco Use    Smoking status: Never Smoker    Smokeless tobacco: Never Used   Substance and Sexual Activity    Alcohol use: Yes       Current Outpatient Medications:     blood sugar diagnostic Strp, 1 strip by skin prick route 3 (three) times daily., Disp: 100 each, Rfl: 3    cyclobenzaprine  "(FLEXERIL) 10 MG tablet, Take 1 tablet (10 mg total) by mouth 3 (three) times daily as needed for Muscle spasms., Disp: 30 tablet, Rfl: 0    HYDROcodone-acetaminophen (NORCO)  mg per tablet, Take 1 tablet by mouth every 4 (four) hours as needed for Pain., Disp: 18 tablet, Rfl: 0    metFORMIN (GLUCOPHAGE) 1000 MG tablet, Take 1 tablet (1,000 mg total) by mouth 2 (two) times daily., Disp: 180 tablet, Rfl: 0    TECHLITE PEN NEEDLE 32 gauge x 5/32" Ndle, use as directed, Disp: , Rfl:     VICTOZA 2-DARYL 0.6 mg/0.1 mL (18 mg/3 mL) PnIj pen, Inject 1.2 mg into the skin once daily., Disp: , Rfl:   Review of patient's allergies indicates:  No Known Allergies    Ht 6' 2" (1.88 m)   Wt (!) 140.6 kg (310 lb)   BMI 39.80 kg/m²     ROS   Comprehensive review of systems completed and negative except as per HPI.      Objective:    Ortho Exam   Head: Normocephalic, without obvious abnormality, atraumatic  Eyes: conjunctivae/corneas clear. EOM's intact  Ears: normal external appearance  Nose: Nares normal. Septum midline. Mucosa normal. No drainage  Throat: normal findings: lips normal without lesions  Lungs: unlabored breathing on room air  Chest wall: symmetric chest rise  Heart: regular rate and rhythm  Pulses: 2+ and symmetric  Skin: Skin color, texture, turgor normal. No rashes or lesions  Neurologic: Grossly normal    Left SHOULDER    Appearance:   Well healed arthroscopic portal sites    Cervical Spine:   No ttp; full, painless ROM; negative Spurlings    Tenderness:   No discrete    AROM:   FF 20, Abduction 20, ER 10, IR side pocket    PROM:  , Abduction 90, ER 15    Pain:  AROM: Positive  PROM:  Positive  End ROM: Positive  Supraspinatus strength testing: Positive  External rotation strength testing: Positive  Tova-scapular: Positive  Virtually all provacative maneuvers Positive    Strength:  Supraspinatus: weak  External rotation: weak  Tova-scapular: intact    Provocative Maneuvers:     Rotator " Cuff/Biceps/AC Joint  Neer's Sign: kiera  Hawkin's Test: kiera  Painful arc: kiera  Belly Press: Negative  Hornblower's Sign: kiera  Speed's Test: kiera  Cross Arm Abduction: kiera    Labrum/Stability  [place]'s Test: kiera  Anterior Apprehension: Negative  Anterior Load and Shift: Negative  Posterior Apprehension: Negative  Posterior Load and Shift: Negative  Sulcus Sign: Positive    Pulses: Palpable radial pulse    Neurological deficits: None    The patient has a warm and well-perfused upper extremity with capillary refill less than 2 seconds. Sensation is intact to light touch in terminal nerve distributions including axillary nerve. 5/5 ain/pin/uln. Deltoid atrophy. 2/5 Axillary. The patient has no palpable epitrochlear lymphadenopathy.        Assessment:     Imaging:   Four view radiographs of the left shoulder obtained today personally reviewed showing no acute fractures or dislocations. Inferior subluxation of the humeral head.  Joint spaces are well maintained.  Hill Sachs deformity.     X-Ray Shoulder 2 or More Views Left  See Provider Notes for results.     IMPRESSION: Please see provider office/clinic notes for radiology   interpretation    This procedure was auto-finalized by: Virtual Radiologist        1. Inferior subluxation of left shoulder, initial encounter    2. Left shoulder pain, unspecified chronicity          Plan:       Orders Placed This Encounter    X-Ray Shoulder 2 or More Views Left    MRI Shoulder Without Contrast Left     Imaging exam findings discussed with the patient.  He has an inferior subluxation of his left glenohumeral joint.  This is atraumatic in nature has been persistent for the past approximately 5 weeks.  He has remote history of left shoulder arthroscopic surgery with rotator cuff repair versus labral repair.  He denies any dislocation event in the past.  Imaging consistent with prior anterior dislocation with Hill-Sachs and bony Bankart lesion.  He has significant loss of range of  motion and strength.  He has sensation intact to the axillary nerve distribution but is not able to fire his deltoid.  We will proceed with MRI evaluation of his left shoulder.  I will see him back afterwards to discuss its findings.  He may also need an EMG/NCS in the future. They are also going to try to get past surgical records. OTC meds as needed for pain.  All of his questions were answered and he was in agreement.

## 2022-08-08 ENCOUNTER — OFFICE VISIT (OUTPATIENT)
Dept: FAMILY MEDICINE | Facility: CLINIC | Age: 37
End: 2022-08-08

## 2022-08-08 VITALS
SYSTOLIC BLOOD PRESSURE: 138 MMHG | HEIGHT: 74 IN | WEIGHT: 314.38 LBS | OXYGEN SATURATION: 100 % | RESPIRATION RATE: 18 BRPM | BODY MASS INDEX: 40.35 KG/M2 | HEART RATE: 94 BPM | TEMPERATURE: 98 F | DIASTOLIC BLOOD PRESSURE: 97 MMHG

## 2022-08-08 DIAGNOSIS — E11.9 TYPE 2 DIABETES MELLITUS WITHOUT COMPLICATION, WITHOUT LONG-TERM CURRENT USE OF INSULIN: Primary | ICD-10-CM

## 2022-08-08 DIAGNOSIS — R03.0 ELEVATED BLOOD PRESSURE READING: ICD-10-CM

## 2022-08-08 DIAGNOSIS — E66.01 MORBID OBESITY: ICD-10-CM

## 2022-08-08 PROCEDURE — 99214 OFFICE O/P EST MOD 30 MIN: CPT | Mod: PBBFAC

## 2022-08-08 RX ORDER — PEN NEEDLE, DIABETIC 30 GX3/16"
1 NEEDLE, DISPOSABLE MISCELLANEOUS DAILY
Qty: 100 EACH | Refills: 2 | Status: SHIPPED | OUTPATIENT
Start: 2022-08-08

## 2022-08-08 RX ORDER — METFORMIN HYDROCHLORIDE 1000 MG/1
1000 TABLET ORAL 2 TIMES DAILY
Qty: 180 TABLET | Refills: 0 | Status: SHIPPED | OUTPATIENT
Start: 2022-08-08 | End: 2023-02-03 | Stop reason: SDUPTHER

## 2022-08-08 NOTE — PROGRESS NOTES
"[facility] OFFICE VISIT NOTE  MRN: [id number]  Date: 08/08/2022    Chief Complaint: Results (re), Medication Refill, and Shoulder Pain    Subjective:    HPI  Gennaro Wild is a 36 y.o. male  presenting to [facility] for routine follow up. Last seen in clinic on 6/6/22 for BP check. Found to have all Bps <140/90. DBP elevated at 97. Not elevated at home.    Acute complaints: Requesting refill on metformin and diabetic supplies. Taking Metformin 1x/day. Using victoza as prescribed. Goal weight of <300lbs. Just got back from cruise, was not eating healthy. Left shoulder pain, managed by Dr. Arnold. Pain is improving and states that he's able to move his shoulder a little better. MRI scheduled for 8/9/22.    Chronic conditions:   DMT2- See notes above. Diagnosed in 2015.On Metformin 1000mg BID and Victoza 1.2mg. Last A1c 9.7%. Fasting lipid panel 2/2/22: chol 185, HDL 33, Trig 243, , Chol/HDL 6, VLDL 49.   Obesity/XIOMARA-see above notes. Compliant with CPAP nightly usually, did not take on cruise.   Inferior subluxation of left glenohumeral joint- followed by Dr. Arnold (ortho). Last visit 8/1/22. "Now with loss of ROM and strength. Sensation intact to the axillary nerve distribution but is not able to fire his deltoid."Pending MRI. May need EMG/NCS in the future.    Review of Systems  Constitutional: no fever, no chills  CV: no chest pain, no palpitations  Resp: no shortness of breath, no cough  GI: no nausea, no vomiting, no constipation, no diarrhea  Neuro: No headache  MSK: see HPI  Skin: no rash    Current Medications:   Current Outpatient Medications   Medication Sig Dispense Refill    blood sugar diagnostic Strp 1 strip by skin prick route 3 (three) times daily. 100 each 3    HYDROcodone-acetaminophen (NORCO)  mg per tablet Take 1 tablet by mouth every 4 (four) hours as needed for Pain. 18 tablet 0    metFORMIN (GLUCOPHAGE) 1000 MG tablet Take 1 tablet (1,000 mg total) by mouth 2 (two) times daily. " "180 tablet 0    TECHLITE PEN NEEDLE 32 gauge x 5/32" Ndle use as directed      VICTOZA 2-DARYL 0.6 mg/0.1 mL (18 mg/3 mL) PnIj pen Inject 1.2 mg into the skin once daily.       No current facility-administered medications for this visit.       Objective:  Blood pressure (!) 138/97, pulse 94, temperature 98.2 °F (36.8 °C), temperature source Oral, resp. rate 18, height 6' 2" (1.88 m), weight (!) 142.6 kg (314 lb 6 oz), SpO2 100 %.   Physical Exam  General: pleasant and cooperative; in no acute distress  Respiratory: clear to auscultation bilaterally. No wheezes, rhonchi, or rales.  Cardiovascular: regular rate and rhythm. S1/S2 present. No murmurs, gallops or rubs appreciated  Gastrointestinal: soft, non-tender, non-distended with normal bowel sounds  Extremities: No peripheral edema    Assessment:   1. Type 2 diabetes mellitus without complication, without long-term current use of insulin    2. Morbid obesity    3. Elevated blood pressure reading        Plan:  -Pt with diagnosis of DMT2 since 2015. A1c has been hovering in the 9s for past few visits. Pt with intermittent compliance to lifestyle changes. Not due for repeat A1c at this visit  -Diabetic testing supplies and meds refilled  -A1c lab order placed to be completed after 9/6/22 but before next office appointment   -Extensive discussion on importance of compliance with lifestyle modifications.   -Also discussed possible initiation of moderate intensity statin  -Pt referred to diabetic/nutrition teaching again  -Pt with moderate hypercholesterolemia (185)  -Previously elevated BP and currently at this visit. Would prefer to keep a log. Did not wear CPAP during vacation. Back to using it nightly. Diagnosis criteria discussed. States normotensive at home. Will continue to monitor  -BP logs to be brought to next visit  -Continue with diet modifications  -Keep sodium intake <2g  -Continue with CPAP use nightly   -Diabetic Foot exam at next visit    Return to " clinic in 6 weeks to discuss A1c, possible medication adjustments, and complete diabetic foot exam. May schedule earlier appointment if needed.    Shanna Tate MD  [employer] [facility] Resident, HO-[facility]

## 2022-08-08 NOTE — PATIENT INSTRUCTIONS
-A1c lab order placed to be completed after 9/6/22 but before office appointment   -Continue with diet modifications  -Will re-discuss initiation of a moderate intensity statin (cholesterol medicine)  -Keep sodium intake <2g  -Continue with CPAP use nightly   -Diabetic Foot exam at next visit

## 2022-08-15 ENCOUNTER — CLINICAL SUPPORT (OUTPATIENT)
Dept: DIABETES | Facility: CLINIC | Age: 37
End: 2022-08-15

## 2022-08-15 VITALS — HEIGHT: 74 IN | BODY MASS INDEX: 40.23 KG/M2 | WEIGHT: 313.5 LBS

## 2022-08-15 DIAGNOSIS — E11.9 TYPE 2 DIABETES MELLITUS WITHOUT COMPLICATION, WITHOUT LONG-TERM CURRENT USE OF INSULIN: ICD-10-CM

## 2022-08-15 PROCEDURE — 99212 OFFICE O/P EST SF 10 MIN: CPT | Mod: PBBFAC

## 2022-08-15 PROCEDURE — G0108 DIAB MANAGE TRN  PER INDIV: HCPCS | Mod: PBBFAC

## 2022-08-15 NOTE — PROGRESS NOTES
Diabetes Care Specialist Progress Note  Author: Rita Oscar RD  Date: 8/15/2022    Program Intake  Reason for Diabetes Program Visit:: Initial Diabetes Assessment  Current diabetes risk level:: moderate  In the last 12 months, have you:: been admitted to a hospital  Was the ER or hospital admission related to diabetes?: No    Lab Results   Component Value Date    HGBA1C 9.7 06/06/2022       Clinical    Patient Health Rating  Compared to other people your age, how would you rate your health?: Poor    Problem Review  Active comorbidities affecting diabetes self-care.: no  Reviewed health maintenance: yes    Clinical Assessment  Current Diabetes Treatment: Oral Medication, Injectable (metformin 1000mg BID, Victoza 0.6mg qAM)  Have you ever experienced hypoglycemia (low blood sugar)?: no  Have you ever experienced hyperglycemia (high blood sugar)?: yes  In the last month, how often have you experienced high blood sugar?: other (see comments) (has been out of test strips for meter since June so has not been checking blood sugars, is picking up supplies & medications today)  Are you able to tell when your blood sugar is high?: Yes  What are your symptoms?: frequent urination, fatigue (had frequent urination when first diagnosed ~ 4-5 years ago)  Have you ever been hospitalized because your blood sugar was high?: yes (see comments) (when first diagnosed ~ 4-5 years ago)    Medication Information  How do you obtain your medications?: Patient drives  How many days a week do you miss your medications?: 3 or more (out of metformin, is picking up from pharmacy today)  Do you sometimes have difficulty refilling your medications?: Yes (see comment) (due to car troubles)  Medication adherence impacting ability to self-manage diabetes?: Yes    Labs  Do you have regular lab work to monitor your medications?: Yes  Lab Compliance Barriers: No    Nutritional Status  Diet: Regular  Meal Plan 24 Hour Recall: Breakfast, Dinner,  Snack  Meal Plan 24 Hour Recall - Breakfast: pan sausage with eggs & 1.5 cups grits, water  Meal Plan 24 Hour Recall - Dinner: 4 large slices hand tossed pizza  Meal Plan 24 Hour Recall - Snack: [place] candy bar  Current nutritional status an area of need that is impacting patient's ability to self-manage diabetes?: No    Additional Social History    Support  Does anyone support you with your diabetes care?: yes  Who supports you?: spouse, self  Who takes you to your medical appointments?: self  Does the current support meet the patient's needs?: Yes  Is Support an area impacting ability to self-manage diabetes?: No    Access to Mass Media & Technology  Does the patient have access to any of the following devices or technologies?: Smart phone  Media or technology needs impacting ability to self-manage diabetes?: No    Cognitive/Behavioral Health  Alert and Oriented: Yes  Difficulty Thinking: No  Requires Prompting: No  Requires assistance for routine expression?: No  Cognitive or behavioral barriers impacting ability to self-manage diabetes?: No         Communication  Language preference: English  Hearing Problems: No  Vision Problems: No  Communication needs impacting ability to self-manage diabetes?: No    Health Literacy  Preferred Learning Method: Face to Face, Reading Materials  Health literacy needs impacting ability to self-manage diabetes?: No      Diabetes Self-Management Skills Assessment    Diabetes Disease Process/Treatment Options  Patient/caregiver able to state what happens when someone has diabetes.: no  Patient/caregiver able to identify at least three signs and symptoms of diabetes.: yes  Identified signs and symptoms:: fatigue, frequent urination, increased thirst  Patient able to identify at least three risk factors for diabetes.: no  Diabetes Disease Process/Treatment Options: Skills Assessment Completed: Yes  Assessment indicates:: Instruction Needed  Area of need?: Yes    Nutrition/Healthy  Eating  Method of carbohydrate measurement:: no method  Patient can identify foods that impact blood sugar.: yes  Patient-identified foods:: starches (bread, pasta, rice, cereal)  Nutrition/Healthy Eating Skills Assessment Completed:: Yes  Assessment indicates:: Instruction Needed  Area of need?: Yes    Physical Activity/Exercise  Patient's daily activity level:: sedentary  Patient formally exercises outside of work.: no  Reasons for not exercising:: other (see comments) (motivation, plans to start walking or going to the gym for >/= 30 minutes daily starting tomorrow 8/16/22)  Patient can identify forms of physical activity.: yes  Stated forms of physical activity::  (walking, going to the gym)  Patient can identify reasons why exercise/physical activity is important in diabetes management.: no  Physical Activity/Exercise Skills Assessment Completed: : Yes  Assessment indicates:: Adequate understanding  Area of need?: Yes    Medications  Patient is able to describe current diabetes management routine.: yes  Diabetes management routine:: injectable medications, oral medications  Patient is able to identify current diabetes medications, dosages, and appropriate timing of medications.: yes  Patient understands the purpose of the medications taken for diabetes.: yes  Patient reports problems or concerns with current medication regimen.: no  Medication Skills Assessment Completed:: Yes  Assessment indicates:: Adequate understanding  Area of need?: Yes    Home Blood Glucose Monitoring  Patient states that blood sugar is checked at home daily.: no  Reasons for not monitoring:: other (see comments) (car troubles affecting ability to obtain supplies from pharmacy)  Home Blood Glucose Monitoring Skills Assessment Completed: : Yes  Assessment indicates:: Adequate understanding  Area of need?: Yes    Acute Complications  Patient is able to identify types of acute complications: No  Acute Complications Skills Assessment  Completed: : Yes  Assessment indicates:: Instruction Needed  Area of need?: Yes    Chronic Complications  Patient can identify major chronic complications of diabetes.: yes  Stated chronic complications:: neuropathy/nerve damage  Patient can identify ways to prevent or delay diabetes complications.: yes  Stated ways to prevent complications:: controlling blood sugar  Patient is aware that having diabetes increases risk of heart disease?: No  Chronic Complications Skills Assessment Completed: : Yes  Assessment indicates:: Instruction Needed  Area of need?: Yes    Psychosocial/Coping  Psychosocial/Coping Skills Assessment Completed: : No  Deffered due to:: Time      Diabetes Self Support Plan         Assessment Summary and Plan    Based on today's diabetes care assessment, the following areas of need were identified:      Social 8/15/2022   Support No   Access to Mass Media/Tech No   Cognitive/Behavioral Health No   Communication No   Health Literacy No        Clinical 8/15/2022   Medication Adherence Yes - see care plan   Lab Compliance No   Nutritional Status No        Diabetes Self-Management Skills 8/15/2022   Diabetes Disease Process/Treatment Options Yes - discussed type 2 diabetes & pathophysiology   Nutrition/Healthy Eating Yes - see care plan   Physical Activity/Exercise Yes - Discussed physical activity & impact on blood sugar & wt loss.  Pt plans to begin exercise routine tomorrow, will walk or go to the gym for >/= 30 minutes daily.   Medication Yes - see care plan   Home Blood Glucose Monitoring Yes - see care plan   Acute Complications Yes - discussed hyperglycemia/hypoglycemia s/s & treatment   Chronic Complications Yes - discussed chronic complications          Today's interventions were provided through individual discussion, instruction, and written materials were provided.      Patient verbalized understanding of instruction and written materials.  Pt was able to return back demonstration of  instructions today. Patient understood key points, needs reinforcement and further instruction.     Diabetes Self-Management Care Plan:    Today's Diabetes Self-Management Care Plan was developed with Gennaro's input. Gennaro has agreed to work toward the following goal(s) to improve his/her overall diabetes control.      Care Plan: Diabetes Management   Updates made since 7/16/2022 12:00 AM      Problem: Healthy Eating       Goal: Patient agrees to eat 2-3 meals daily with 45g of Carbohydrate per meal.  Pt will limit snacks to 15-30g carbohydrate per snack as needed.    Start Date: 8/16/2022   Expected End Date: 9/15/2022   Priority: High   Barriers: No Barriers Identified   Note:    8/15/22: Pt reports that he has tried a vegetarian diet in the recent past & he was able to lose wt & improve blood sugars.  Pt has not been following this diet lately but plans to resume in the near future.  Pt denies regular consumption of sugary beverages.  Discussed sources of carbohydrate & appropriate portion sizes.  Gave examples of high fiber foods to include in diet.  Encouraged plenty of nonstarchy vegetables.  Reviewed label reading for grams of total carbohydrate & serving size.  Pt reports that he does not currently have measuring cups but plans to get some.      Task: Reviewed the sources and role of Carbohydrate, Protein, and Fat and how each nutrient impacts blood sugar. Completed 8/15/2022      Task: Provided visual examples using dry measuring cups, food models, and other familiar objects such as computer mouse, deck or cards, tennis ball etc. to help with visualization of portions. Completed 8/15/2022      Task: Explained how to count carbohydrates using the food label and the use of dry measuring cups for accurate carb counting. Completed 8/15/2022         Task: Review the importance of balancing carbohydrates with each meal using portion control techniques to count servings of carbohydrate and label reading to  identify serving size and amount of total carbs per serving. Completed 8/15/2022      Task: Provided Sample plate method and reviewed the use of the plate to estimate amounts of carbohydrate per meal. Completed 8/15/2022      Care Plan: Diabetes Management   Updates made since 7/16/2022 12:00 AM      Problem: Medications       Goal: Patient Agrees to take Diabetes Medication(s) daily as prescribed.    Start Date: 8/16/2022   Expected End Date: 9/16/2022   Priority: High   Barriers: No Barriers Identified   Note:    8/15/22: Pt reports that he has been out of metformin so has not been taking.  Plans to  medications at pharmacy today.  Had not been able to  lately due to lack of transportation/car troubles.  Pt agreeable to take metformin & victoza daily as Rx'd.     Task: Reviewed with patient all current diabetes medications and provided basic review of the purpose, dosage, & frequency. Completed 8/15/2022            Task: Discussed guidelines for preventing, detecting and treating hypoglycemia and hyperglycemia and reviewed the importance of meal and medication timing with diabetes mediations for prevention of hypoglycemia and maximum drug benefit. Completed 8/15/2022      Care Plan: Diabetes Management   Updates made since 7/16/2022 12:00 AM      Problem: Blood Glucose Self-Monitoring       Goal: Patient agrees to check and record blood sugars 1 time per day.    Start Date: 8/16/2022   Expected End Date: 9/16/2022   Priority: High   Barriers: No Barriers Identified   Note:    8/15/22: Pt reports that he has not been checking blood sugars due to being out of test strips since June.  Plans to  supplies at pharmacy today.  Had not been able to  lately due to lack of transportation/car troubles.  Pt agreeable to check fasting blood sugar daily moving forward.  Provided pt with copy of blood sugar log.            Task: Reviewed the importance of self-monitoring blood glucose and keeping  logs. Completed 8/15/2022          Follow Up Plan     Pt has been through complete diabetes education in the past.  Seen today for refresher.  Pt wishes to call with questions as needed vs scheduling follow up at this time.  Follow up if symptoms worsen or fail to improve.    Today's care plan and follow up schedule was discussed with patient.  Gennaro verbalized understanding of the care plan, goals, and agrees to follow up plan.        The patient was encouraged to communicate with his/her health care provider/physician and care team regarding his/her condition(s) and treatment.  I provided the patient with my contact information today and encouraged to contact me via phone or [facility] Patient Portal as needed.     Length of Visit   Total Time: 60 Minutes

## 2022-08-23 ENCOUNTER — TELEPHONE (OUTPATIENT)
Dept: ORTHOPEDICS | Facility: CLINIC | Age: 37
End: 2022-08-23

## 2022-08-23 DIAGNOSIS — S43.032A: Primary | ICD-10-CM

## 2022-08-23 DIAGNOSIS — S43.005D DISLOCATION OF LEFT SHOULDER JOINT, SUBSEQUENT ENCOUNTER: ICD-10-CM

## 2022-08-30 ENCOUNTER — TELEPHONE (OUTPATIENT)
Dept: ORTHOPEDICS | Facility: CLINIC | Age: 37
End: 2022-08-30

## 2022-08-30 NOTE — TELEPHONE ENCOUNTER
Pt called again asking if we could give him a call with his MRI results because he doesn't want to come back in and have to pay another $200 office visit fee just to come in for his results.

## 2022-09-06 NOTE — TELEPHONE ENCOUNTER
Spoke with patient and discussed MRI findings. He has been working on exercises on his own. Pain and motion have improved. Will proceed with PT. Follow up in 4 weeks.

## 2022-09-19 ENCOUNTER — LAB VISIT (OUTPATIENT)
Dept: LAB | Facility: HOSPITAL | Age: 37
End: 2022-09-19
Attending: STUDENT IN AN ORGANIZED HEALTH CARE EDUCATION/TRAINING PROGRAM

## 2022-09-19 DIAGNOSIS — E11.9 TYPE 2 DIABETES MELLITUS WITHOUT COMPLICATION, WITHOUT LONG-TERM CURRENT USE OF INSULIN: ICD-10-CM

## 2022-09-19 LAB
EST. AVERAGE GLUCOSE BLD GHB EST-MCNC: 234.6 MG/DL
HBA1C MFR BLD: 9.8 %

## 2022-09-19 PROCEDURE — 83036 HEMOGLOBIN GLYCOSYLATED A1C: CPT

## 2022-09-19 PROCEDURE — 36415 COLL VENOUS BLD VENIPUNCTURE: CPT

## 2022-09-20 ENCOUNTER — CLINICAL SUPPORT (OUTPATIENT)
Dept: FAMILY MEDICINE | Facility: CLINIC | Age: 37
End: 2022-09-20
Attending: STUDENT IN AN ORGANIZED HEALTH CARE EDUCATION/TRAINING PROGRAM

## 2022-09-20 ENCOUNTER — OFFICE VISIT (OUTPATIENT)
Dept: FAMILY MEDICINE | Facility: CLINIC | Age: 37
End: 2022-09-20

## 2022-09-20 VITALS
HEART RATE: 87 BPM | OXYGEN SATURATION: 99 % | HEIGHT: 74 IN | WEIGHT: 313.63 LBS | DIASTOLIC BLOOD PRESSURE: 88 MMHG | SYSTOLIC BLOOD PRESSURE: 131 MMHG | TEMPERATURE: 98 F | BODY MASS INDEX: 40.25 KG/M2 | RESPIRATION RATE: 18 BRPM

## 2022-09-20 DIAGNOSIS — E11.9 TYPE 2 DIABETES MELLITUS WITHOUT COMPLICATION, WITHOUT LONG-TERM CURRENT USE OF INSULIN: ICD-10-CM

## 2022-09-20 DIAGNOSIS — G47.33 OBSTRUCTIVE SLEEP APNEA SYNDROME: ICD-10-CM

## 2022-09-20 DIAGNOSIS — E11.9 TYPE 2 DIABETES MELLITUS WITHOUT COMPLICATION, WITHOUT LONG-TERM CURRENT USE OF INSULIN: Primary | ICD-10-CM

## 2022-09-20 DIAGNOSIS — R03.0 ELEVATED BLOOD PRESSURE READING: ICD-10-CM

## 2022-09-20 PROCEDURE — 99214 OFFICE O/P EST MOD 30 MIN: CPT | Mod: PBBFAC

## 2022-09-20 PROCEDURE — 92228 IMG RTA DETC/MNTR DS PHY/QHP: CPT | Mod: PBBFAC

## 2022-09-20 NOTE — PROGRESS NOTES
[facility] OFFICE VISIT NOTE  MRN: [id number]  Date: 09/20/2022    Chief Complaint: Diabetes    Subjective:    HPI  Gennaro Wild is a 36 y.o. male  presenting to [facility] for routine follow up for DMT2    Acute complaints: Has not been checking glucose at home, recently just picked up with testings strips that were refilled at last visit.Taking Metformin 1x/day forgets second dose. Has not been taking Victoza consistently, last use 3 days ago. Reports wearing CPAP nightly. Goal weight remains  <300lbs. Admits to not having made sustainable diet modifications. Results of A1c discussed in clinic. Extensive discussion regarding his age and the need to attain better glycemic control. At this level he should be on insulin or another form or anti glycemic. Reluctant to start any new meds today. Keeps stressing the fact he knows what to do and needs time to do so. States he has done it before     here for a diabetic eye screening with dilated fundus photos per  Patient cooperative?: Yes  Small pupils?: No  Last eye exam: 12/2021  For exam results, see Encounter Report.     Chronic conditions:   DMT2- See notes above. Diagnosed in 2015.On Metformin 1000mg BID and Victoza 1.2mg. Last A1c 9.8%. Fasting lipid panel 2/2/22: chol 185, HDL 33, Trig 243, , Chol/HDL 6, VLDL 49.  Obesity/XIOMARA-see above notes.   Inferior subluxation of left glenohumeral joint- followed by Dr. Arnold (ortho)    Review of Systems  Constitutional: no fever, no chills  CV: no chest pain, no palpitations  Resp: no shortness of breath, no cough  GI: no nausea, no vomiting, no constipation, no diarrhea  Neuro: No headache  MSK: see HPI  Skin: no rash    Current Medications:   Current Outpatient Medications   Medication Sig Dispense Refill    blood sugar diagnostic Strp 1 strip by skin prick route 3 (three) times daily. 100 each 3    HYDROcodone-acetaminophen (NORCO)  mg per tablet Take 1 tablet by mouth every 4 (four) hours as needed for  "Pain. 18 tablet 0    metFORMIN (GLUCOPHAGE) 1000 MG tablet Take 1 tablet (1,000 mg total) by mouth 2 (two) times daily. 180 tablet 0    pen needle, diabetic 32 gauge x 5/32" Ndle 1 each by Misc.(Non-Drug; Combo Route) route once daily at 6am. 100 each 2    VICTOZA 2-DARYL 0.6 mg/0.1 mL (18 mg/3 mL) PnIj pen Inject 1.2 mg into the skin once daily.       No current facility-administered medications for this visit.       Objective:  Blood pressure 131/88, pulse 87, temperature 97.5 °F (36.4 °C), temperature source Oral, resp. rate 18, height 6' 2" (1.88 m), weight (!) 142.2 kg (313 lb 9.6 oz), SpO2 99 %.   Physical Exam  General: pleasant and cooperative; in no acute distress  Respiratory: clear to auscultation bilaterally. No wheezes, rhonchi, or rales.  Cardiovascular: regular rate and rhythm. S1/S2 present. No murmurs, gallops or rubs appreciated  Gastrointestinal: soft, non-tender, non-distended with normal bowel sounds  Extremities: No peripheral edema    Protective Sensation (w/ 10 gram monofilament):  Right: Intact  Left: Intact    Visual Inspection:  Normal -  Bilateral, Nails Intact - without Evidence of Foot Deformity- Neither, Ulceration -  Neither, Blister -  Neither, Skin Breakdown -  Neither, Erythema -  Neither, Increased Warmth -  Neither, Callus -  Neither, Dry Skin -  Neither, and Onychomycosis -  Neither    Pedal Pulses:   Right: Present  Left: Present    Posterior tibialis:   Right:Present  Left: Present    Assessment:   1. Type 2 diabetes mellitus without complication, without long-term current use of insulin    2. Obstructive sleep apnea syndrome    3. Elevated blood pressure reading        Plan:  -Pt with diagnosis of DMT2 since 2015. A1c has been hovering in the 9s for past few visits. Pt with intermittent compliance to lifestyle changes. Repeat A1c 9.8.   -Despite extensive discussion on importance of medication compliance and lifestyle modification at his age, patient is non-compliant  -Informed " decline of insulin initiation or addition of another oral medications  -Asked to be given another 3 months to make the necessary changes  -Repeat A1c to be completed prior to next follow up.   -Diabetic testing supplies and meds refilled  -Also discussed possible initiation of moderate intensity statin, declined at this time  -Wearing CPAP nightly, BP reflects this. Office BP at goal.   -Continue to wear CPAP nightly  -Continue with diet modifications (Keep sodium intake <2g, decrease sugary/processed foods)  -Diabetic foot exam and Fundus photo completed at this visit.   -ED precautions for hyperglycemia (BG >400)    RTC in 3 months with repeat A1c. If no change pt is amendable to starting insulin or other oral medication for better glycemic control. May schedule earlier appointment if needed.     Shanna Tate MD  [facility]  Resident, HO-3

## 2022-09-21 ENCOUNTER — DOCUMENTATION ONLY (OUTPATIENT)
Dept: FAMILY MEDICINE | Facility: CLINIC | Age: 37
End: 2022-09-21

## 2022-09-26 NOTE — PROGRESS NOTES
Gennaro Wild is a 36 year old male here for a diabetic eye screening with non-dilated fundus photos per Dr. Shanna Tate.     Patient cooperative?: Yes  Small pupils?: No  Last eye exam: Unknown    For exam results, see Encounter Report.

## 2022-09-30 NOTE — PROGRESS NOTES
Faculty addendum: Patient discussed with resident. Chart was reviewed including vitals, labs, etc. Care provided reasonable and necessary. I participated in the management of the patient and was immediately available throughout the encounter. Services were furnished in a primary care center located in the outpatient department of a [facility] hospital. I agree with the resident's findings and plan as documented in the resident's note.

## 2023-02-03 ENCOUNTER — OFFICE VISIT (OUTPATIENT)
Dept: FAMILY MEDICINE | Facility: CLINIC | Age: 38
End: 2023-02-03

## 2023-02-03 VITALS
HEART RATE: 93 BPM | SYSTOLIC BLOOD PRESSURE: 127 MMHG | WEIGHT: 315 LBS | RESPIRATION RATE: 20 BRPM | OXYGEN SATURATION: 98 % | HEIGHT: 74 IN | BODY MASS INDEX: 40.43 KG/M2 | DIASTOLIC BLOOD PRESSURE: 82 MMHG | TEMPERATURE: 97 F

## 2023-02-03 DIAGNOSIS — E11.9 TYPE 2 DIABETES MELLITUS WITHOUT COMPLICATION, WITHOUT LONG-TERM CURRENT USE OF INSULIN: Primary | ICD-10-CM

## 2023-02-03 PROCEDURE — 99214 OFFICE O/P EST MOD 30 MIN: CPT | Mod: PBBFAC

## 2023-02-03 RX ORDER — METFORMIN HYDROCHLORIDE 1000 MG/1
1000 TABLET ORAL 2 TIMES DAILY
Qty: 180 TABLET | Refills: 0 | Status: SHIPPED | OUTPATIENT
Start: 2023-02-03 | End: 2023-10-05 | Stop reason: SDUPTHER

## 2023-02-03 RX ORDER — LIRAGLUTIDE 6 MG/ML
0.6 INJECTION SUBCUTANEOUS DAILY
Qty: 3 ML | Refills: 0 | Status: SHIPPED | OUTPATIENT
Start: 2023-02-03 | End: 2023-04-24

## 2023-02-03 NOTE — PROGRESS NOTES
"[facility]    CC:   Follow-up and Medication Refill (All medications. )       HPI:  Gennaro Wild is a 37 y.o. male who presents for DM follow up     Hx of uncontrolled DM   A1C has been >9 over his past few visits. 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   Respiratory:  Negative for shortness of breath.    Cardiovascular:  Negative for chest pain.   Gastrointestinal:  Negative for abdominal pain, nausea and vomiting.      Current Outpatient Medications   Medication Instructions    blood sugar diagnostic Strp 1 strip, skin prick, 3 times daily    HYDROcodone-acetaminophen (NORCO)  mg per tablet 1 tablet, Oral, Every 4 hours PRN    metFORMIN (GLUCOPHAGE) 1,000 mg, Oral, 2 times daily    pen needle, diabetic 32 gauge x 5/32" Ndle 1 each, Misc.(Non-Drug; Combo Route), Daily    VICTOZA 2-DARYL 1.2 mg, Subcutaneous, Daily        Physical Exam:   Vitals:    02/03/23 0955   BP: 127/82   Pulse: 93   Resp: 20   Temp: 97.3 °F (36.3 °C)      Physical Exam   Constitutional: He is oriented to person, place, and time.  Non-toxic appearance. No distress.   Eyes: Conjunctivae are normal.   Cardiovascular: Normal rate and regular rhythm. Exam reveals no gallop and no friction rub.   No murmur heard.  Pulmonary/Chest: Effort normal and breath sounds normal. No respiratory distress. He has no wheezes. He has no rales.   Abdominal: Soft. Bowel sounds are normal. He exhibits no distension. There is no abdominal tenderness. "   Musculoskeletal:      Right lower leg: No edema.      Left lower leg: No edema.   Neurological: He is alert and oriented to person, place, and time.      Assessment/Plan:  1. Type 2 diabetes mellitus without complication, without long-term current use of insulin    - Discussed importance of medication compliance to prevent DM complications in depth   - Repeat A1C and lipid panel   - Needs moderate intensity statin, to discuss at follow up visit   - Consider transition to Trulicity to help with compliance however given his Self pay status do not believe we will be able to obtain this medication for him   - Will send him to insurance office today to see if he qualifies for medicaid vs indigent care    - He does not want to start insulin therapy  - Will continue with his current regimen for now. Victoza 0.6mg and metformin 1000 BID   - Refer for eye exam   - Foot exam and urine at next visit      Return to clinic in 4 weeks with PCP     López Almeida M.D. [facility]

## 2023-02-04 NOTE — PROGRESS NOTES
Date if encounter 02-03-23.   DM, suboptimal control.  Less than full adherence to medication regimen.  Resident's note reviewed 02-04-23.  Agree with assessment; plan of care appropriate.  Professional services provided in an outpatient primary care center affiliated with a [facility] institution.

## 2023-04-24 ENCOUNTER — OFFICE VISIT (OUTPATIENT)
Dept: FAMILY MEDICINE | Facility: CLINIC | Age: 38
End: 2023-04-24

## 2023-04-24 VITALS
DIASTOLIC BLOOD PRESSURE: 87 MMHG | OXYGEN SATURATION: 99 % | SYSTOLIC BLOOD PRESSURE: 130 MMHG | HEART RATE: 90 BPM | HEIGHT: 74 IN | WEIGHT: 315 LBS | RESPIRATION RATE: 18 BRPM | TEMPERATURE: 98 F | BODY MASS INDEX: 40.43 KG/M2

## 2023-04-24 DIAGNOSIS — E11.9 TYPE 2 DIABETES MELLITUS WITHOUT COMPLICATION, WITHOUT LONG-TERM CURRENT USE OF INSULIN: Primary | ICD-10-CM

## 2023-04-24 LAB
CHOLEST SERPL-MCNC: 197 MG/DL
CHOLEST/HDLC SERPL: 5 {RATIO} (ref 0–5)
EST. AVERAGE GLUCOSE BLD GHB EST-MCNC: 231.7 MG/DL
HBA1C MFR BLD: 9.7 %
HDLC SERPL-MCNC: 41 MG/DL (ref 35–60)
LDLC SERPL CALC-MCNC: 126 MG/DL (ref 50–140)
TRIGL SERPL-MCNC: 148 MG/DL (ref 34–140)
VLDLC SERPL CALC-MCNC: 30 MG/DL

## 2023-04-24 PROCEDURE — 99214 OFFICE O/P EST MOD 30 MIN: CPT | Mod: PBBFAC

## 2023-04-24 PROCEDURE — 36415 COLL VENOUS BLD VENIPUNCTURE: CPT

## 2023-04-24 PROCEDURE — 83036 HEMOGLOBIN GLYCOSYLATED A1C: CPT

## 2023-04-24 PROCEDURE — 80061 LIPID PANEL: CPT

## 2023-04-24 RX ORDER — LIRAGLUTIDE 6 MG/ML
1.2 INJECTION SUBCUTANEOUS DAILY
Qty: 6 ML | Refills: 3 | Status: SHIPPED | OUTPATIENT
Start: 2023-04-24 | End: 2023-10-05 | Stop reason: SDUPTHER

## 2023-04-24 NOTE — MEDICAL/APP STUDENT
"Subjective     Patient ID: Gennaro Wild is a 37 y.o. male.    Chief Complaint: Medication Refill (Needs refill on victoza)    Mr. Gennaro Wild is a 36yo male with a past medical history of gout (last flare up 9 mo ago, controlled by diet) and poorly controlled DM on metformin 1000mg BID and victoza injections 1.2mg daily, who has been historically poorly compliant with therapy. His last a1c was 9.8% six months ago. Today he reports that he has been keeping a journal of his sugars (before meals) daily since about the beginning of April. He has also added fiber to his meals and has been cutting out sugary drinks. He also reports that he has begun exercising in the last week as well. His blood glucose readings were reading in the 300s in march, and he has seen steady improvement with his dietary changes and increased medication compliance. He says he usually takes his metformin as directed, but misses doses "every once in a while." He has been out of victoza for about a week, stating that he has not been able to pick it up from the pharmacy recently. He reports having seen ophthalmology since his last visit 6 months ago, and has not heard anything back from them. He denies any acute complaints at this time.    Review of Systems   Constitutional:  Negative for chills, fatigue and fever.   HENT: Negative.     Eyes:  Negative for visual disturbance.   Respiratory:  Negative for cough and shortness of breath.    Cardiovascular:  Negative for chest pain and palpitations.   Gastrointestinal:  Negative for constipation, diarrhea, nausea and vomiting.   Endocrine: Negative for polydipsia and polyuria.   Genitourinary:  Negative for dysuria, frequency and urgency.   Musculoskeletal: Negative.    Integumentary:  Negative for rash and wound.   Neurological:  Negative for weakness and numbness.   Psychiatric/Behavioral: Negative.          Objective     Physical Exam  Constitutional:       General: He is not in acute " distress.     Appearance: Normal appearance. He is obese.   HENT:      Head: Normocephalic and atraumatic.      Nose: No congestion or rhinorrhea.   Eyes:      Extraocular Movements: Extraocular movements intact.      Pupils: Pupils are equal, round, and reactive to light.   Cardiovascular:      Rate and Rhythm: Normal rate and regular rhythm.      Pulses: Normal pulses.      Heart sounds: No murmur heard.    No friction rub. No gallop.   Pulmonary:      Effort: Pulmonary effort is normal.      Breath sounds: Normal breath sounds.   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 No swelling or tenderness.   Feet:      Comments: Diabetic foot exam was within normal limits. No sensory deficits on the plantar surface of the foot. No ulcers or lesions noted on the plantar surface, nor in the interdigital spaces.  Skin:     General: Skin is warm and dry.      Findings: Lesion (Brown macule on the right palmar surface at the level of the 1st MCP. states has been there since childhood) present. No rash.   Neurological:      Mental Status: He is alert.      Sensory: No sensory deficit.          Assessment and Plan     Problem List Items Addressed This Visit          Endocrine    Type 2 diabetes mellitus - Primary    Relevant Orders    Hemoglobin A1C    Lipid Panel       T2DM  - Counseled patient on the importance of medication compliance  -Patient does not have insurance, is a self payor for his medications. This is a potential barrier to compliance.  - Educated patient to get a mixture of fasting glucose readings as well as post-meal glucose readings  - Lipid panel today, last done 2021; consider starting moderate intensity statin   - A1c today, last done 6 mo ago  - Recommend diabetic urine screen today, last microalbumin/creatinine ratio was done 1 year ago and was 20.4  - Has seen ophthalmology   - Discuss Hep C screening, HIV screening  - Due for  Covid-19, PCV, Tetanus, and Flu vaccines.

## 2023-04-25 NOTE — PROGRESS NOTES
"[facility] OFFICE VISIT NOTE  MRN: [id number]  Date: 04/24/2023    Chief Complaint: Medication Refill (Needs refill on victoza)    Subjective:  Gennaro Wild is a 37 y.o. male  presenting to [facility] for routine follow up.    Acute complaints: Doing wel, no acute complaintsl. Requesting refill on Victoza. Patient states he officially started keeping track of his Blood sugars at home 4/2023. Has been consistent with meds for past 3 weeks. Noticed a difference in the way he feels. Fasting blood sugars have been <120; usually in 200s. He says he usually takes his metformin as directed, but misses doses "every once in a while." He has been out of victoza for about a week, stating that he has not been able to pick it up from the pharmacy recently. Repeat lipids and A1c due.     Chronic conditions:   DMT2- Diagnosed in 2015.On Metformin 1000mg BID and Victoza 1.2mg. Last A1c 9.7%. Fasting lipid panel 2/2/22: chol 185, HDL 33, Trig 243, , Chol/HDL 6, VLDL 49.   Obesity/XIOMARA-Compliant with CPAP nightly usually, did not take on cruise.   Inferior subluxation of left glenohumeral joint- followed by Dr. Arnold (ortho). Doing much better, he is back to full weight bearing activities.     Review of Systems  Constitutional: no fever, no chills  CV: no chest pain, no palpitations  Resp: no shortness of breath, no cough  GI: no nausea, no vomiting, no constipation, no diarrhea  Neuro: No headache  Skin: no rash    Current Medications:   Current Outpatient Medications   Medication Sig Dispense Refill    blood sugar diagnostic Strp 1 strip by skin prick route 3 (three) times daily. 100 each 3    HYDROcodone-acetaminophen (NORCO)  mg per tablet Take 1 tablet by mouth every 4 (four) hours as needed for Pain. 18 tablet 0    liraglutide 0.6 mg/0.1 mL, 18 mg/3 mL, subq PNIJ (VICTOZA 3-DARYL) 0.6 mg/0.1 mL (18 mg/3 mL) PnIj pen Inject 1.2 mg into the skin once daily. 6 mL 3    metFORMIN (GLUCOPHAGE) 1000 MG tablet Take 1 tablet " "(1,000 mg total) by mouth 2 (two) times daily. 180 tablet 0    pen needle, diabetic 32 gauge x 5/32" Ndle 1 each by Misc.(Non-Drug; Combo Route) route once daily at 6am. 100 each 2     No current facility-administered medications for this visit.       Objective:  Blood pressure 130/87, pulse 90, temperature 98.1 °F (36.7 °C), temperature source Oral, resp. rate 18, height 6' 2" (1.88 m), weight (!) 147.4 kg (325 lb), SpO2 99 %.   Physical Exam  General: pleasant and cooperative; in no acute distress  Respiratory: clear to auscultation bilaterally. No wheezes, rhonchi, or rales.  Cardiovascular: regular rate and rhythm. S1/S2 present. No murmurs, gallops or rubs appreciated  Gastrointestinal: soft, non-tender, non-distended with normal bowel sounds  Extremities: No peripheral edema    Diabetic foot exam:   Left: Reflexes 2+    Vibratory sensation  note tested    Proprioception normal   Sharp/dull discrimination normal   Filament test present  Right: Reflexes 2+    Vibratory sensation  not tested    Proprioception normal   Sharp/dull discrimination normal   Filament test present      Assessment:   1. Type 2 diabetes mellitus without complication, without long-term current use of insulin        Plan:  -Pt with diagnosis of DMT2 since 2015. A1c has been hovering in the 9s for past few visits. Pt with intermittent compliance to lifestyle changes. A1c due  -Importance and harms of uncontrolled DM were relayed to the patient. He expressed understanding and wants time on current regiment and he is active with his lifestyle modification. Again, just started.   -It appears to be a cycle, but patient appears adamant about proceeding with his new changes. Will not be reflected in A1c as he just started beginning of April  -Based on A1c will have to propose treatment plan. At A1c of 9 he would benefit from addition of long acting insulin. Although he states fasting sugars are <120. Will continue to monitor  -Repeat DM labs in 3 " months (get urine at next visit)  -Nightly use of CPAP, BP at goal.     Return to clinic in 3 months for routine follow up, address vaccines-patient self pay. Will call with results. May schedule earlier appointment if needed.    Shanna Tate MD  U  Resident, HO-3

## 2023-04-28 NOTE — PROGRESS NOTES
I reviewed History, PE, A/P and medical record.  Services provided in outpatient department of a teaching hospital[facility]facility, I was immediately available.  I agree with resident. Care provided was reasonable and necessary.   I discussed the patient with resident at time of visit.

## 2023-10-05 DIAGNOSIS — E11.9 TYPE 2 DIABETES MELLITUS WITHOUT COMPLICATION, WITHOUT LONG-TERM CURRENT USE OF INSULIN: ICD-10-CM

## 2023-10-05 RX ORDER — LIRAGLUTIDE 6 MG/ML
1.2 INJECTION SUBCUTANEOUS DAILY
Qty: 6 ML | Refills: 0 | Status: SHIPPED | OUTPATIENT
Start: 2023-10-05 | End: 2023-11-17 | Stop reason: SDUPTHER

## 2023-10-05 RX ORDER — METFORMIN HYDROCHLORIDE 1000 MG/1
1000 TABLET ORAL 2 TIMES DAILY
Qty: 60 TABLET | Refills: 0 | Status: SHIPPED | OUTPATIENT
Start: 2023-10-05 | End: 2023-11-17 | Stop reason: SDUPTHER

## 2023-11-17 ENCOUNTER — OFFICE VISIT (OUTPATIENT)
Dept: FAMILY MEDICINE | Facility: CLINIC | Age: 38
End: 2023-11-17

## 2023-11-17 VITALS
OXYGEN SATURATION: 98 % | TEMPERATURE: 98 F | SYSTOLIC BLOOD PRESSURE: 138 MMHG | BODY MASS INDEX: 39.89 KG/M2 | HEIGHT: 74 IN | DIASTOLIC BLOOD PRESSURE: 72 MMHG | WEIGHT: 310.81 LBS | HEART RATE: 90 BPM

## 2023-11-17 DIAGNOSIS — E11.9 TYPE 2 DIABETES MELLITUS WITHOUT COMPLICATION, WITHOUT LONG-TERM CURRENT USE OF INSULIN: Primary | ICD-10-CM

## 2023-11-17 DIAGNOSIS — R03.0 ELEVATED BLOOD PRESSURE READING: ICD-10-CM

## 2023-11-17 LAB
CREAT UR-MCNC: 57.3 MG/DL (ref 63–166)
MICROALBUMIN UR-MCNC: 24.7 UG/ML
MICROALBUMIN/CREAT RATIO PNL UR: 43.1 MG/GM CR (ref 0–30)

## 2023-11-17 PROCEDURE — 99214 OFFICE O/P EST MOD 30 MIN: CPT | Mod: PBBFAC | Performed by: STUDENT IN AN ORGANIZED HEALTH CARE EDUCATION/TRAINING PROGRAM

## 2023-11-17 PROCEDURE — 82043 UR ALBUMIN QUANTITATIVE: CPT | Performed by: STUDENT IN AN ORGANIZED HEALTH CARE EDUCATION/TRAINING PROGRAM

## 2023-11-17 RX ORDER — LIRAGLUTIDE 6 MG/ML
1.2 INJECTION SUBCUTANEOUS DAILY
Qty: 6 ML | Refills: 0 | Status: SHIPPED | OUTPATIENT
Start: 2023-11-17 | End: 2023-12-14 | Stop reason: SDUPTHER

## 2023-11-17 RX ORDER — METFORMIN HYDROCHLORIDE 1000 MG/1
1000 TABLET ORAL 2 TIMES DAILY
Qty: 60 TABLET | Refills: 0 | Status: SHIPPED | OUTPATIENT
Start: 2023-11-17 | End: 2023-12-14 | Stop reason: SDUPTHER

## 2023-11-27 ENCOUNTER — LAB VISIT (OUTPATIENT)
Dept: LAB | Facility: HOSPITAL | Age: 38
End: 2023-11-27
Attending: STUDENT IN AN ORGANIZED HEALTH CARE EDUCATION/TRAINING PROGRAM

## 2023-11-27 DIAGNOSIS — E11.9 TYPE 2 DIABETES MELLITUS WITHOUT COMPLICATION, WITHOUT LONG-TERM CURRENT USE OF INSULIN: ICD-10-CM

## 2023-11-27 DIAGNOSIS — R03.0 ELEVATED BLOOD PRESSURE READING: ICD-10-CM

## 2023-11-27 LAB
ALBUMIN SERPL-MCNC: 3.9 G/DL (ref 3.5–5)
ALBUMIN/GLOB SERPL: 1.1 RATIO (ref 1.1–2)
ALP SERPL-CCNC: 68 UNIT/L (ref 40–150)
ALT SERPL-CCNC: 19 UNIT/L (ref 0–55)
AST SERPL-CCNC: 14 UNIT/L (ref 5–34)
BASOPHILS # BLD AUTO: 0.04 X10(3)/MCL
BASOPHILS NFR BLD AUTO: 0.7 %
BILIRUB SERPL-MCNC: 0.5 MG/DL
BUN SERPL-MCNC: 9.6 MG/DL (ref 8.9–20.6)
CALCIUM SERPL-MCNC: 9.6 MG/DL (ref 8.4–10.2)
CHLORIDE SERPL-SCNC: 100 MMOL/L (ref 98–107)
CO2 SERPL-SCNC: 30 MMOL/L (ref 22–29)
CREAT SERPL-MCNC: 0.86 MG/DL (ref 0.73–1.18)
EOSINOPHIL # BLD AUTO: 0.32 X10(3)/MCL (ref 0–0.9)
EOSINOPHIL NFR BLD AUTO: 5.2 %
ERYTHROCYTE [DISTWIDTH] IN BLOOD BY AUTOMATED COUNT: 12.5 % (ref 11.5–17)
EST. AVERAGE GLUCOSE BLD GHB EST-MCNC: 303.4 MG/DL
GFR SERPLBLD CREATININE-BSD FMLA CKD-EPI: >60 MLS/MIN/1.73/M2
GLOBULIN SER-MCNC: 3.7 GM/DL (ref 2.4–3.5)
GLUCOSE SERPL-MCNC: 263 MG/DL (ref 74–100)
HBA1C MFR BLD: 12.2 %
HCT VFR BLD AUTO: 44.2 % (ref 42–52)
HCV AB SERPL QL IA: NONREACTIVE
HGB BLD-MCNC: 14.4 G/DL (ref 14–18)
HIV 1+2 AB+HIV1 P24 AG SERPL QL IA: NONREACTIVE
IMM GRANULOCYTES # BLD AUTO: 0.01 X10(3)/MCL (ref 0–0.04)
IMM GRANULOCYTES NFR BLD AUTO: 0.2 %
LYMPHOCYTES # BLD AUTO: 2.13 X10(3)/MCL (ref 0.6–4.6)
LYMPHOCYTES NFR BLD AUTO: 34.9 %
MCH RBC QN AUTO: 26.2 PG (ref 27–31)
MCHC RBC AUTO-ENTMCNC: 32.6 G/DL (ref 33–36)
MCV RBC AUTO: 80.4 FL (ref 80–94)
MONOCYTES # BLD AUTO: 0.48 X10(3)/MCL (ref 0.1–1.3)
MONOCYTES NFR BLD AUTO: 7.9 %
NEUTROPHILS # BLD AUTO: 3.12 X10(3)/MCL (ref 2.1–9.2)
NEUTROPHILS NFR BLD AUTO: 51.1 %
NRBC BLD AUTO-RTO: 0 %
PLATELET # BLD AUTO: 419 X10(3)/MCL (ref 130–400)
PMV BLD AUTO: 10.4 FL (ref 7.4–10.4)
POTASSIUM SERPL-SCNC: 4.3 MMOL/L (ref 3.5–5.1)
PROT SERPL-MCNC: 7.6 GM/DL (ref 6.4–8.3)
RBC # BLD AUTO: 5.5 X10(6)/MCL (ref 4.7–6.1)
SODIUM SERPL-SCNC: 139 MMOL/L (ref 136–145)
VIT B12 SERPL-MCNC: 971 PG/ML (ref 213–816)
WBC # SPEC AUTO: 6.1 X10(3)/MCL (ref 4.5–11.5)

## 2023-11-27 PROCEDURE — 85025 COMPLETE CBC W/AUTO DIFF WBC: CPT

## 2023-11-27 PROCEDURE — 80053 COMPREHEN METABOLIC PANEL: CPT

## 2023-11-27 PROCEDURE — 82607 VITAMIN B-12: CPT

## 2023-11-27 PROCEDURE — 86803 HEPATITIS C AB TEST: CPT

## 2023-11-27 PROCEDURE — 83036 HEMOGLOBIN GLYCOSYLATED A1C: CPT

## 2023-11-27 PROCEDURE — 87389 HIV-1 AG W/HIV-1&-2 AB AG IA: CPT

## 2023-11-27 PROCEDURE — 36415 COLL VENOUS BLD VENIPUNCTURE: CPT

## 2023-12-14 ENCOUNTER — OFFICE VISIT (OUTPATIENT)
Dept: FAMILY MEDICINE | Facility: CLINIC | Age: 38
End: 2023-12-14

## 2023-12-14 VITALS
HEART RATE: 92 BPM | DIASTOLIC BLOOD PRESSURE: 86 MMHG | SYSTOLIC BLOOD PRESSURE: 130 MMHG | RESPIRATION RATE: 18 BRPM | TEMPERATURE: 98 F | WEIGHT: 315 LBS | BODY MASS INDEX: 40.43 KG/M2 | OXYGEN SATURATION: 99 % | HEIGHT: 74 IN

## 2023-12-14 DIAGNOSIS — E11.9 TYPE 2 DIABETES MELLITUS WITHOUT COMPLICATION, WITHOUT LONG-TERM CURRENT USE OF INSULIN: ICD-10-CM

## 2023-12-14 PROCEDURE — 99214 OFFICE O/P EST MOD 30 MIN: CPT | Mod: PBBFAC | Performed by: STUDENT IN AN ORGANIZED HEALTH CARE EDUCATION/TRAINING PROGRAM

## 2023-12-14 RX ORDER — INSULIN GLARGINE 100 [IU]/ML
25 INJECTION, SOLUTION SUBCUTANEOUS NIGHTLY
Qty: 7.5 ML | Refills: 3 | Status: SHIPPED | OUTPATIENT
Start: 2023-12-14 | End: 2024-12-13

## 2023-12-14 RX ORDER — METFORMIN HYDROCHLORIDE 1000 MG/1
1000 TABLET ORAL 2 TIMES DAILY
Qty: 180 TABLET | Refills: 0 | Status: SHIPPED | OUTPATIENT
Start: 2023-12-14 | End: 2024-03-13

## 2023-12-14 RX ORDER — LIRAGLUTIDE 6 MG/ML
1.2 INJECTION SUBCUTANEOUS DAILY
Qty: 6 ML | Refills: 5 | Status: SHIPPED | OUTPATIENT
Start: 2023-12-14

## 2023-12-14 RX ORDER — PEN NEEDLE, DIABETIC 30 GX3/16"
1 NEEDLE, DISPOSABLE MISCELLANEOUS NIGHTLY
Qty: 90 EACH | Refills: 3 | Status: SHIPPED | OUTPATIENT
Start: 2023-12-14 | End: 2024-06-11

## 2023-12-14 NOTE — PROGRESS NOTES
Chief Complaint  Diabetes      History of Present Illness  Gennaro Wild is a 38 y.o. year old male presents to the clinic for f/u on diabetes. Pt has been out of his metformin and victoza for a couple of month, re-started on both of the medications 1 month ago. Today he brings his fasting CBG readings, that are in the low to mid 200's. Patient admits to eating more recently but is also trying to exercise and lift weights. Otherwise doing well.       Chronic conditions:   DMT2- Diagnosed in 2015.On Metformin 1000mg BID and Victoza 1.2mg. Last A1c 12.2 % in April, 2023. Fasting lipid panel 04/2023: chol 197, Trig 148, , not at goal.   Obesity/XIOMARA-Compliant with CPAP nightly usually.          Review of Systems   Constitutional:  Negative for fever.   Respiratory:  Negative for shortness of breath.    Cardiovascular:  Negative for chest pain, palpitations and leg swelling.   Gastrointestinal:  Negative for constipation, diarrhea, nausea and vomiting.   Neurological:  Negative for headaches.         Physical Exam  Vitals and nursing note reviewed.   Constitutional:       General: He is not in acute distress.     Comments: BMI 40.98   Eyes:      Conjunctiva/sclera: Conjunctivae normal.   Cardiovascular:      Rate and Rhythm: Normal rate and regular rhythm.      Heart sounds: Normal heart sounds.   Pulmonary:      Effort: Pulmonary effort is normal.      Breath sounds: Normal breath sounds.   Musculoskeletal:      Right lower leg: No edema.      Left lower leg: No edema.   Neurological:      General: No focal deficit present.   Psychiatric:         Mood and Affect: Mood normal.         Vitals:    12/14/23 1122   BP: 130/86   Pulse: 92   Resp: 18   Temp: 98.2 °F (36.8 °C)     Wt Readings from Last 2 Encounters:   12/14/23 (!) 144.8 kg (319 lb 3.2 oz)   11/17/23 (!) 141 kg (310 lb 12.8 oz)         Current Outpatient Medications  Current Outpatient Medications   Medication Instructions    blood sugar diagnostic  "Strp 1 strip, skin prick, 3 times daily    HYDROcodone-acetaminophen (NORCO)  mg per tablet 1 tablet, Oral, Every 4 hours PRN    insulin (LANTUS SOLOSTAR U-100 INSULIN) 25 Units, Subcutaneous, Nightly    metFORMIN (GLUCOPHAGE) 1,000 mg, Oral, 2 times daily    pen needle, diabetic (PEN NEEDLE) 30 gauge x 5/16" Ndle 1 pen , Misc.(Non-Drug; Combo Route), Nightly    pen needle, diabetic 32 gauge x 5/32" Ndle 1 each, Misc.(Non-Drug; Combo Route), Daily    VICTOZA 3-DARYL 1.2 mg, Subcutaneous, Daily             Assessment / Plan:    1. Type 2 diabetes mellitus without complication, without long-term current use of insulin  -given significantly elevated A1c and morning fasting sugars above 200, will initiate patient on basal insulin at 0.2 units/kg once daily  -will re-check blood sugar readings in 2 weeks to evaluate if he needs dosage change  -continue with metformin 1000mg bid and victosa 1.2mg daily  -could consider switching to another GLP-1 with better weight loss benefits at next visit  -also discussed with patient regarding the potential harmful consequences of uncontrolled diabetes and obesity  -signs and sx of hypoglycemia discussed with patient      Follow up:    In 2 weeks, or earlier if needed. Will need to review CBG and BP readings.     Paulina Chandler M.D.  U  PGY-3  "

## 2023-12-15 NOTE — PROGRESS NOTES
I have discussed the case with the resident and reviewed the resident's history and physical, assessment, plan, and progress note. I agree with the findings.       Joselo Martinez MD  [facility]

## 2023-12-28 ENCOUNTER — DOCUMENTATION ONLY (OUTPATIENT)
Dept: FAMILY MEDICINE | Facility: CLINIC | Age: 38
End: 2023-12-28

## 2023-12-28 NOTE — PROGRESS NOTES
Pt had an appt at [facility] clinic today on 12/28/23 to ensure he is doing well on his newly initiated insulin, pt re-scheduled appt for 1/5/23    Paulina Chandler M.D.  [facility] PGY-3

## 2024-05-31 ENCOUNTER — CLINICAL SUPPORT (OUTPATIENT)
Dept: FAMILY MEDICINE | Facility: CLINIC | Age: 39
End: 2024-05-31
Attending: STUDENT IN AN ORGANIZED HEALTH CARE EDUCATION/TRAINING PROGRAM

## 2024-05-31 ENCOUNTER — OFFICE VISIT (OUTPATIENT)
Dept: FAMILY MEDICINE | Facility: CLINIC | Age: 39
End: 2024-05-31

## 2024-05-31 VITALS
RESPIRATION RATE: 20 BRPM | SYSTOLIC BLOOD PRESSURE: 127 MMHG | OXYGEN SATURATION: 99 % | DIASTOLIC BLOOD PRESSURE: 88 MMHG | WEIGHT: 315 LBS | TEMPERATURE: 98 F | HEART RATE: 95 BPM | HEIGHT: 74 IN | BODY MASS INDEX: 40.43 KG/M2

## 2024-05-31 DIAGNOSIS — Z79.4 TYPE 2 DIABETES MELLITUS TREATED WITH INSULIN: Primary | ICD-10-CM

## 2024-05-31 DIAGNOSIS — E11.9 TYPE 2 DIABETES MELLITUS WITHOUT COMPLICATION, WITHOUT LONG-TERM CURRENT USE OF INSULIN: ICD-10-CM

## 2024-05-31 DIAGNOSIS — E11.9 TYPE 2 DIABETES MELLITUS TREATED WITH INSULIN: Primary | ICD-10-CM

## 2024-05-31 LAB
ALBUMIN SERPL-MCNC: 4.2 G/DL (ref 3.5–5)
ALBUMIN/GLOB SERPL: 1.1 RATIO (ref 1.1–2)
ALP SERPL-CCNC: 69 UNIT/L (ref 40–150)
ALT SERPL-CCNC: 20 UNIT/L (ref 0–55)
ANION GAP SERPL CALC-SCNC: 11 MEQ/L
AST SERPL-CCNC: 17 UNIT/L (ref 5–34)
BILIRUB SERPL-MCNC: 0.5 MG/DL
BUN SERPL-MCNC: 11.8 MG/DL (ref 8.9–20.6)
CALCIUM SERPL-MCNC: 10.3 MG/DL (ref 8.4–10.2)
CHLORIDE SERPL-SCNC: 99 MMOL/L (ref 98–107)
CHOLEST SERPL-MCNC: 229 MG/DL
CHOLEST/HDLC SERPL: 6 {RATIO} (ref 0–5)
CO2 SERPL-SCNC: 28 MMOL/L (ref 22–29)
CREAT SERPL-MCNC: 0.96 MG/DL (ref 0.73–1.18)
CREAT/UREA NIT SERPL: 12
EST. AVERAGE GLUCOSE BLD GHB EST-MCNC: 257.5 MG/DL
GFR SERPLBLD CREATININE-BSD FMLA CKD-EPI: >60 ML/MIN/1.73/M2
GLOBULIN SER-MCNC: 3.8 GM/DL (ref 2.4–3.5)
GLUCOSE SERPL-MCNC: 168 MG/DL (ref 74–100)
HBA1C MFR BLD: 10.6 %
HDLC SERPL-MCNC: 38 MG/DL (ref 35–60)
POTASSIUM SERPL-SCNC: 4.1 MMOL/L (ref 3.5–5.1)
PROT SERPL-MCNC: 8 GM/DL (ref 6.4–8.3)
SODIUM SERPL-SCNC: 138 MMOL/L (ref 136–145)
TRIGL SERPL-MCNC: 490 MG/DL (ref 34–140)

## 2024-05-31 PROCEDURE — 80061 LIPID PANEL: CPT | Performed by: STUDENT IN AN ORGANIZED HEALTH CARE EDUCATION/TRAINING PROGRAM

## 2024-05-31 PROCEDURE — 80053 COMPREHEN METABOLIC PANEL: CPT | Performed by: STUDENT IN AN ORGANIZED HEALTH CARE EDUCATION/TRAINING PROGRAM

## 2024-05-31 PROCEDURE — 99213 OFFICE O/P EST LOW 20 MIN: CPT | Mod: PBBFAC | Performed by: STUDENT IN AN ORGANIZED HEALTH CARE EDUCATION/TRAINING PROGRAM

## 2024-05-31 PROCEDURE — 83036 HEMOGLOBIN GLYCOSYLATED A1C: CPT | Performed by: STUDENT IN AN ORGANIZED HEALTH CARE EDUCATION/TRAINING PROGRAM

## 2024-05-31 PROCEDURE — 36415 COLL VENOUS BLD VENIPUNCTURE: CPT | Performed by: STUDENT IN AN ORGANIZED HEALTH CARE EDUCATION/TRAINING PROGRAM

## 2024-05-31 PROCEDURE — 92228 IMG RTA DETC/MNTR DS PHY/QHP: CPT | Mod: PBBFAC

## 2024-05-31 PROCEDURE — 92228 IMG RTA DETC/MNTR DS PHY/QHP: CPT | Mod: TC,PBBFAC

## 2024-05-31 RX ORDER — INSULIN GLARGINE 100 [IU]/ML
25 INJECTION, SOLUTION SUBCUTANEOUS NIGHTLY
Qty: 7.5 ML | Refills: 3 | Status: SHIPPED | OUTPATIENT
Start: 2024-05-31 | End: 2025-05-31

## 2024-05-31 RX ORDER — METFORMIN HYDROCHLORIDE 1000 MG/1
1000 TABLET ORAL 2 TIMES DAILY
Qty: 180 TABLET | Refills: 0 | Status: SHIPPED | OUTPATIENT
Start: 2024-05-31 | End: 2024-08-29

## 2024-05-31 RX ORDER — PEN NEEDLE, DIABETIC 30 GX3/16"
1 NEEDLE, DISPOSABLE MISCELLANEOUS 2 TIMES DAILY
Qty: 100 EACH | Refills: 2 | Status: SHIPPED | OUTPATIENT
Start: 2024-05-31

## 2024-05-31 RX ORDER — LIRAGLUTIDE 6 MG/ML
1.2 INJECTION SUBCUTANEOUS DAILY
Qty: 6 ML | Refills: 5 | Status: SHIPPED | OUTPATIENT
Start: 2024-05-31

## 2024-05-31 NOTE — PROGRESS NOTES
Gennaro Wild is a 38 y.o. male here for a diabetic eye screening with non-dilated fundus photos per Dr. Chandler  .    Patient cooperative?: Yes  Small pupils?: No  Last eye exam: unknown    For exam results, see Encounter Report.

## 2024-05-31 NOTE — PROGRESS NOTES
Chief Complaint  Diabetes (Type 2)      History of Present Illness  Gennaro Wild is a 38 y.o. year old male presents to the clinic for f/u on diabetes. Last seen 12/14/23, at which time he was initiated on insulin glargine 25units nightly, unfortunately pt was lost to follow up for several appts afterwards.   Lowest , highest 260. 160's recently.   No complaints.         PMH/Chronic issues (problems addressed at this visit are noted under A&P)  DMT2- Diagnosed in 2015.On Metformin 1000mg BID and Victoza 1.2mg.  A1c 5/31/24 was 10.6 %.. Fasting lipid panel 04/2023: chol 197, Trig 148, , not at goal. . Urine microalbumin 11/2023 was 24.7. eye and foot exam 05/2024  Obesity/XIOMARA-Compliant with CPAP nightly usually.          Review of Systems   Constitutional:  Negative for fever.   Respiratory:  Negative for shortness of breath.    Cardiovascular:  Negative for chest pain, palpitations and leg swelling.   Gastrointestinal:  Negative for constipation, diarrhea, nausea and vomiting.   Neurological:  Negative for headaches.         Physical Exam  Vitals and nursing note reviewed.   Constitutional:       General: He is not in acute distress.  Eyes:      Conjunctiva/sclera: Conjunctivae normal.   Cardiovascular:      Rate and Rhythm: Normal rate and regular rhythm.      Heart sounds: Normal heart sounds.   Pulmonary:      Effort: Pulmonary effort is normal.      Breath sounds: Normal breath sounds.   Musculoskeletal:      Right lower leg: No edema.      Left lower leg: No edema.   Neurological:      General: No focal deficit present.   Psychiatric:         Mood and Affect: Mood normal.       Protective Sensation (w/ 10 gram monofilament):  Right: Intact  Left: Intact    Visual Inspection:  Normal -  Bilateral    Pedal Pulses:   Right: Present  Left: Present    Posterior Tibialis Pulses:   Right:Present  Left: Present      Vitals:    05/31/24 1505   BP: 127/88   Pulse: 95   Resp: 20   Temp: 98 °F (36.7 °C)  "    Wt Readings from Last 2 Encounters:   05/31/24 (!) 147.8 kg (325 lb 12.8 oz)   12/14/23 (!) 144.8 kg (319 lb 3.2 oz)         Current Outpatient Medications  Current Outpatient Medications   Medication Instructions    blood sugar diagnostic Strp 1 strip, skin prick, 3 times daily    HYDROcodone-acetaminophen (NORCO)  mg per tablet 1 tablet, Oral, Every 4 hours PRN    insulin glargine U-100 (Lantus) (LANTUS SOLOSTAR U-100 INSULIN) 25 Units, Subcutaneous, Nightly    metFORMIN (GLUCOPHAGE) 1,000 mg, Oral, 2 times daily    pen needle, diabetic (PEN NEEDLE) 30 gauge x 5/16" Ndle 1 pen , Misc.(Non-Drug; Combo Route), Nightly    pen needle, diabetic 32 gauge x 5/32" Ndle 1 each, Misc.(Non-Drug; Combo Route), Daily    VICTOZA 3-DARYL 1.2 mg, Subcutaneous, Daily             Assessment / Plan:    1. Type 2 diabetes mellitus treated with insulin    - Hemoglobin A1C improved somewhat from previous  -continue with lantus 25 units qhs, metformin 1000mg bid and viztoza  -needs closer follow up and CBG monitoring  - Comprehensive Metabolic Panel  - Lipid Panel  - Diabetic Eye Screening Photo; Future             Follow up:    In 4 weeks, or earlier if needed.     Paulina Chandler M.D.  [facility] PGY-3  "

## 2024-06-25 NOTE — PROGRESS NOTES
Faculty addendum: Patient discussed with resident. Chart was reviewed including vitals, labs, etc. Care provided reasonable and necessary. I participated in the management of the patient and was immediately available throughout the encounter. Services were furnished in a primary care center located in the outpatient department of a [facility] hospital. I agree with the resident's findings and plan as documented in the resident's note.

## 2024-07-26 ENCOUNTER — OFFICE VISIT (OUTPATIENT)
Dept: FAMILY MEDICINE | Facility: CLINIC | Age: 39
End: 2024-07-26

## 2024-07-26 VITALS
BODY MASS INDEX: 39.78 KG/M2 | WEIGHT: 310 LBS | TEMPERATURE: 98 F | SYSTOLIC BLOOD PRESSURE: 134 MMHG | HEIGHT: 74 IN | OXYGEN SATURATION: 97 % | DIASTOLIC BLOOD PRESSURE: 83 MMHG | HEART RATE: 99 BPM

## 2024-07-26 DIAGNOSIS — Z79.4 TYPE 2 DIABETES MELLITUS TREATED WITH INSULIN: Primary | ICD-10-CM

## 2024-07-26 DIAGNOSIS — E11.9 TYPE 2 DIABETES MELLITUS TREATED WITH INSULIN: Primary | ICD-10-CM

## 2024-07-26 DIAGNOSIS — E78.1 HYPERTRIGLYCERIDEMIA: ICD-10-CM

## 2024-07-26 PROCEDURE — 99213 OFFICE O/P EST LOW 20 MIN: CPT | Mod: PBBFAC

## 2024-07-26 NOTE — PROGRESS NOTES
"[facility] OFFICE VISIT NOTE  Gennaro Wild  [id number]  07/27/2024    Chief Complaint   Patient presents with    Follow-up    Diabetes       Gennaro Wild is a 38 y.o. male  presenting to [facility] for diabetes f/u.    History of present Illness    Diabetes: reports compliance with medication. Upon dispense data review, has not yet filled Victoza this month. Denies GI upset, dizziness, fatigue, polydipsia, polyuria, or numbness/tingling.    Review of Systems  As per hpi    Vitals:    07/26/24 1545   BP: 134/83   Pulse: 99   Temp: 97.5 °F (36.4 °C)   TempSrc: Oral   SpO2: 97%   Weight: (!) 140.6 kg (310 lb)   Height: 6' 2" (1.88 m)        Physical Exam  Constitutional:       General: He is not in acute distress.  HENT:      Head: Normocephalic.      Mouth/Throat:      Mouth: Mucous membranes are moist.   Eyes:      Extraocular Movements: Extraocular movements intact.   Cardiovascular:      Rate and Rhythm: Normal rate and regular rhythm.      Pulses: Normal pulses.      Heart sounds: Normal heart sounds.   Pulmonary:      Effort: Pulmonary effort is normal.      Breath sounds: Normal breath sounds.   Abdominal:      General: Bowel sounds are normal. There is no distension.      Palpations: Abdomen is soft.      Tenderness: There is no abdominal tenderness.      Comments: obese   Skin:     General: Skin is warm and dry.   Neurological:      General: No focal deficit present.      Mental Status: He is alert.          Current Medications:   Current Outpatient Medications   Medication Sig Dispense Refill    blood sugar diagnostic Strp 1 strip by skin prick route 2 (two) times a day. 100 each 3    insulin glargine U-100, Lantus, (LANTUS SOLOSTAR U-100 INSULIN) 100 unit/mL (3 mL) InPn pen Inject 25 Units into the skin every evening. 7.5 mL 3    liraglutide 0.6 mg/0.1 mL, 18 mg/3 mL, subq PNIJ (VICTOZA 3-DARYL) 0.6 mg/0.1 mL (18 mg/3 mL) PnIj pen Inject 1.2 mg into the skin once daily. 6 mL 5    metFORMIN (GLUCOPHAGE) 1000 MG " "tablet Take 1 tablet (1,000 mg total) by mouth 2 (two) times daily. 180 tablet 0    pen needle, diabetic 32 gauge x 5/32" Ndle 1 each by Misc.(Non-Drug; Combo Route) route 2 (two) times a day. 100 each 2     No current facility-administered medications for this visit.       Assessment:   1. Type 2 diabetes mellitus treated with insulin  Last A1c 10.6. repeat at next visit  Encouraged continued medication compliance  Encouraged lifestyle modification with dietary changes and weight loss  Resources given for low glycemic index and plant based meals for patient to try. Encouraged to try 1-2 meals per week  Bring CBG logs to next visit  Start moderate statin for cardiovascular protection,   Cmp wnl    2. Hypertriglyceridemia   Triglyceride of 490. Recommendations for medication management of consistently >500.   Encouraged lifestyle modification I.e cessation of limiting alcohol consumption, avoiding fried, fast, and fatty foods. Weight loss and exercise as above.  Repeat at next visit         Follow up in about 6 weeks (around 9/6/2024) for diabetes.     Jeancarlos Abbasi DO  [facility] Resident, HO-II     "

## 2024-07-27 RX ORDER — ATORVASTATIN CALCIUM 10 MG/1
10 TABLET, FILM COATED ORAL DAILY
Qty: 90 TABLET | Refills: 0 | Status: SHIPPED | OUTPATIENT
Start: 2024-07-27

## 2024-12-26 ENCOUNTER — OFFICE VISIT (OUTPATIENT)
Dept: FAMILY MEDICINE | Facility: CLINIC | Age: 39
End: 2024-12-26

## 2024-12-26 VITALS
HEART RATE: 98 BPM | OXYGEN SATURATION: 98 % | HEIGHT: 74 IN | TEMPERATURE: 98 F | WEIGHT: 309.63 LBS | BODY MASS INDEX: 39.74 KG/M2 | SYSTOLIC BLOOD PRESSURE: 131 MMHG | DIASTOLIC BLOOD PRESSURE: 96 MMHG

## 2024-12-26 DIAGNOSIS — E11.9 TYPE 2 DIABETES MELLITUS TREATED WITH INSULIN: ICD-10-CM

## 2024-12-26 DIAGNOSIS — Z79.4 TYPE 2 DIABETES MELLITUS TREATED WITH INSULIN: ICD-10-CM

## 2024-12-26 LAB
CREAT UR-MCNC: 61.5 MG/DL (ref 63–166)
EST. AVERAGE GLUCOSE BLD GHB EST-MCNC: 326.4 MG/DL
HBA1C MFR BLD: 13 %
MICROALBUMIN UR-MCNC: 42 UG/ML
MICROALBUMIN/CREAT RATIO PNL UR: 68.3 MG/GM CR (ref 0–30)

## 2024-12-26 PROCEDURE — 36415 COLL VENOUS BLD VENIPUNCTURE: CPT

## 2024-12-26 PROCEDURE — 83036 HEMOGLOBIN GLYCOSYLATED A1C: CPT

## 2024-12-26 PROCEDURE — 99213 OFFICE O/P EST LOW 20 MIN: CPT | Mod: PBBFAC

## 2024-12-26 PROCEDURE — 82570 ASSAY OF URINE CREATININE: CPT

## 2024-12-26 RX ORDER — SEMAGLUTIDE 0.68 MG/ML
0.5 INJECTION, SOLUTION SUBCUTANEOUS
Qty: 3 ML | Refills: 0 | Status: SHIPPED | OUTPATIENT
Start: 2024-12-26

## 2024-12-26 RX ORDER — PEN NEEDLE, DIABETIC 30 GX3/16"
1 NEEDLE, DISPOSABLE MISCELLANEOUS 2 TIMES DAILY
Qty: 100 EACH | Refills: 2 | Status: SHIPPED | OUTPATIENT
Start: 2024-12-26

## 2024-12-26 RX ORDER — LIRAGLUTIDE 6 MG/ML
1.2 INJECTION SUBCUTANEOUS DAILY
Qty: 6 ML | Refills: 5 | Status: SHIPPED | OUTPATIENT
Start: 2024-12-26 | End: 2024-12-26

## 2024-12-26 RX ORDER — ATORVASTATIN CALCIUM 10 MG/1
10 TABLET, FILM COATED ORAL DAILY
Qty: 90 TABLET | Refills: 0 | Status: SHIPPED | OUTPATIENT
Start: 2024-12-26

## 2024-12-26 RX ORDER — METFORMIN HYDROCHLORIDE 1000 MG/1
1000 TABLET ORAL 2 TIMES DAILY
Qty: 180 TABLET | Refills: 0 | Status: SHIPPED | OUTPATIENT
Start: 2024-12-26

## 2024-12-26 RX ORDER — INSULIN GLARGINE 100 [IU]/ML
25 INJECTION, SOLUTION SUBCUTANEOUS NIGHTLY
Qty: 7.5 ML | Refills: 3 | Status: SHIPPED | OUTPATIENT
Start: 2024-12-26

## 2024-12-26 NOTE — PROGRESS NOTES
"Family Medicine Clinic Note     Subjective     Patient ID: Gennaro Wild is a 39 y.o. male.    Chief Complaint: A1C F/U    HPI  Dm type II: has been without his victoza for at least a month due to back order at the pharmacy. Has been exercising but diet has not been the best with the recent holidays.       Current Outpatient Medications   Medication Instructions    atorvastatin (LIPITOR) 10 mg, Oral, Daily    blood sugar diagnostic Strp 1 strip, skin prick, 2 times daily    empagliflozin (JARDIANCE) 10 mg, Oral, Daily    LANTUS SOLOSTAR U-100 INSULIN 25 Units, Subcutaneous, Nightly    metFORMIN (GLUCOPHAGE) 1,000 mg, Oral, 2 times daily    OZEMPIC 0.5 mg, Subcutaneous, Every 7 days    pen needle, diabetic 32 gauge x 5/32" Ndle 1 each, Misc.(Non-Drug; Combo Route), 2 times daily       Review of Systems   Constitutional:  Negative for fever.   Respiratory:  Negative for shortness of breath.    Cardiovascular:  Negative for chest pain and leg swelling.   Gastrointestinal:  Negative for abdominal pain, diarrhea, nausea and vomiting.   Genitourinary:  Negative for dysuria.   Skin:  Negative for rash.   Neurological:  Negative for dizziness and headaches.        Objective     Vitals:    12/26/24 1337 12/26/24 1421   BP: (!) 129/94 (!) 131/96   Pulse: 98    Temp: 98.3 °F (36.8 °C)    TempSrc: Oral    SpO2: 98%    Weight: (!) 140.4 kg (309 lb 9.6 oz)    Height: 6' 2" (1.88 m)         Physical Exam  Constitutional:       General: He is not in acute distress.  HENT:      Head: Normocephalic.      Mouth/Throat:      Mouth: Mucous membranes are moist.   Eyes:      Extraocular Movements: Extraocular movements intact.   Cardiovascular:      Rate and Rhythm: Normal rate and regular rhythm.      Pulses: Normal pulses.      Heart sounds: Normal heart sounds.   Pulmonary:      Effort: Pulmonary effort is normal.      Breath sounds: Normal breath sounds.   Abdominal:      General: Bowel sounds are normal. There is no distension. "      Palpations: Abdomen is soft.      Tenderness: There is no abdominal tenderness.   Skin:     General: Skin is warm and dry.   Neurological:      General: No focal deficit present.      Mental Status: He is alert.          Assessment and Plan    1. Type 2 diabetes mellitus treated with insulin  Elevated A1c at 13.0  Has been without victoza for at least 1 month  Start ozempic at 0.5, titrate us a tolerated  No family hx of thyroid cancer, no person hx of gallstones, thyroid polyps or nodules  Start jardiance 10mg, risk discussed with patient  Continue insulin 25 nightly and metformin  Urine microalbumin/cr ratio elevated, will start losartan  Medi refills sent     Follow up in about 6 weeks (around 2/6/2025) for ozempic .    Jeancarlos Abbasi DO  [facility] Family Medicine Resident, [facility]

## 2025-03-03 ENCOUNTER — OFFICE VISIT (OUTPATIENT)
Dept: FAMILY MEDICINE | Facility: CLINIC | Age: 40
End: 2025-03-03

## 2025-03-03 VITALS
TEMPERATURE: 98 F | HEART RATE: 86 BPM | OXYGEN SATURATION: 100 % | WEIGHT: 312.38 LBS | SYSTOLIC BLOOD PRESSURE: 118 MMHG | HEIGHT: 74 IN | RESPIRATION RATE: 20 BRPM | DIASTOLIC BLOOD PRESSURE: 82 MMHG | BODY MASS INDEX: 40.09 KG/M2

## 2025-03-03 DIAGNOSIS — E11.9 TYPE 2 DIABETES MELLITUS TREATED WITH INSULIN: Primary | ICD-10-CM

## 2025-03-03 DIAGNOSIS — Z79.4 TYPE 2 DIABETES MELLITUS TREATED WITH INSULIN: Primary | ICD-10-CM

## 2025-03-03 DIAGNOSIS — L73.9 FOLLICULITIS: ICD-10-CM

## 2025-03-03 PROCEDURE — 87077 CULTURE AEROBIC IDENTIFY: CPT

## 2025-03-03 PROCEDURE — 99214 OFFICE O/P EST MOD 30 MIN: CPT | Mod: PBBFAC

## 2025-03-03 RX ORDER — LIRAGLUTIDE 6 MG/ML
1.2 INJECTION SUBCUTANEOUS DAILY
Qty: 6 ML | Refills: 3 | Status: SHIPPED | OUTPATIENT
Start: 2025-03-03

## 2025-03-03 RX ORDER — INSULIN GLARGINE 100 [IU]/ML
25 INJECTION, SOLUTION SUBCUTANEOUS NIGHTLY
Qty: 7.5 ML | Refills: 3 | Status: SHIPPED | OUTPATIENT
Start: 2025-03-03

## 2025-03-03 RX ORDER — METFORMIN HYDROCHLORIDE 1000 MG/1
1000 TABLET ORAL 2 TIMES DAILY
Qty: 180 TABLET | Refills: 0 | Status: SHIPPED | OUTPATIENT
Start: 2025-03-03

## 2025-03-03 RX ORDER — ATORVASTATIN CALCIUM 10 MG/1
10 TABLET, FILM COATED ORAL DAILY
Qty: 90 TABLET | Refills: 0 | Status: SHIPPED | OUTPATIENT
Start: 2025-03-03

## 2025-03-03 RX ORDER — DOXYCYCLINE 100 MG/1
100 CAPSULE ORAL 2 TIMES DAILY
Qty: 28 CAPSULE | Refills: 0 | Status: SHIPPED | OUTPATIENT
Start: 2025-03-03 | End: 2025-03-17

## 2025-03-03 RX ORDER — PEN NEEDLE, DIABETIC 30 GX3/16"
1 NEEDLE, DISPOSABLE MISCELLANEOUS 2 TIMES DAILY
Qty: 100 EACH | Refills: 2 | Status: SHIPPED | OUTPATIENT
Start: 2025-03-03

## 2025-03-03 NOTE — PROGRESS NOTES
"Family Medicine Clinic Note     Subjective     Patient ID: Gennaro Wild is a 39 y.o. male.    Chief Complaint: Follow-up    HPI  Diabetes, type 2: discontinued ozempic after 1 month due to price. Resumed taking victoza immediately upon discontinuation. Did not start Jardiance after last visit. State he has been eating better and exercising daily. Did not bring sugar log but states they have been good.    Cyst to posterior scalp 3 days ago. Denies fever, pain. Noticed after haircut with new soni.    PMHx:  Active Problem List with Overview Notes    Diagnosis Date Noted    Inferior subluxation of left shoulder 08/01/2022    Morbid obesity 05/25/2022    Obstructive sleep apnea syndrome 05/25/2022    Type 2 diabetes mellitus 05/25/2022        Current Outpatient Medications   Medication Instructions    atorvastatin (LIPITOR) 10 mg, Oral, Daily    blood sugar diagnostic Strp 1 strip, skin prick, 2 times daily    doxycycline (VIBRAMYCIN) 100 mg, Oral, 2 times daily    empagliflozin (JARDIANCE) 10 mg, Oral, Daily    LANTUS SOLOSTAR U-100 INSULIN 25 Units, Subcutaneous, Nightly    liraglutide 0.6 mg/0.1 mL (18 mg/3 mL) subq PNIJ (VICTOZA 3-DARYL) 1.2 mg, Subcutaneous, Daily    metFORMIN (GLUCOPHAGE) 1,000 mg, Oral, 2 times daily    pen needle, diabetic 32 gauge x 5/32" Ndle 1 each, Misc.(Non-Drug; Combo Route), 2 times daily       Review of Systems   Constitutional:  Negative for chills, fever and weight loss.   Respiratory:  Negative for shortness of breath.    Cardiovascular:  Negative for chest pain and leg swelling.   Gastrointestinal:  Negative for constipation, diarrhea and vomiting.   Genitourinary:  Negative for dysuria, frequency and urgency.   Musculoskeletal:  Negative for myalgias.   Neurological:  Negative for dizziness and headaches.        Objective     Vitals:    03/03/25 1325 03/03/25 1327   BP: (!) 136/95 118/82   Pulse: 87 86   Resp: 20    Temp: 98.1 °F (36.7 °C)    TempSrc: Oral    SpO2: 100%  " "  Weight: (!) 141.7 kg (312 lb 6.4 oz)    Height: 6' 2" (1.88 m)         Physical Exam  Vitals reviewed.   Constitutional:       General: He is not in acute distress.  HENT:      Mouth/Throat:      Mouth: Mucous membranes are moist.   Eyes:      Conjunctiva/sclera: Conjunctivae normal.      Pupils: Pupils are equal, round, and reactive to light.   Cardiovascular:      Rate and Rhythm: Normal rate and regular rhythm.      Pulses: Normal pulses.      Heart sounds: Normal heart sounds. No murmur heard.  Pulmonary:      Effort: Pulmonary effort is normal. No respiratory distress.      Breath sounds: Normal breath sounds. No wheezing, rhonchi or rales.   Abdominal:      General: There is no distension.      Palpations: Abdomen is soft.      Tenderness: There is no abdominal tenderness.   Musculoskeletal:         General: No tenderness.      Right lower leg: No edema.      Left lower leg: No edema.   Skin:     General: Skin is warm and dry.      Capillary Refill: Capillary refill takes less than 2 seconds.      Comments: 4x4 induration with center area of drainage to nape of neck/posterior scalp   Neurological:      General: No focal deficit present.      Mental Status: He is alert.          Assessment and Plan    1. Type 2 diabetes mellitus treated with insulin (Primary)  A1c at next visit  Start jardiance, discussed r/b/a and pt expressed understanding  Medication refills sent  - metFORMIN (GLUCOPHAGE) 1000 MG tablet; Take 1 tablet (1,000 mg total) by mouth 2 (two) times daily.  Dispense: 180 tablet; Refill: 0  - insulin glargine U-100, Lantus, (LANTUS SOLOSTAR U-100 INSULIN) 100 unit/mL (3 mL) InPn pen; Inject 25 Units into the skin every evening.  Dispense: 7.5 mL; Refill: 3    2. Folliculitis  Wound culture prelim staph aureus,   Start doxycycline 100 mg BID for 14 days and will revaluate need for minor procedure referral  Keep area clean and dry.  - Wound Culture  - doxycycline (VIBRAMYCIN) 100 MG Cap; Take 1 " capsule (100 mg total) by mouth 2 (two) times daily. for 14 days  Dispense: 28 capsule; Refill: 0      Follow up in about 2 months (around 5/3/2025).    Jeancarlos Abbasi DO  [facility] Family Medicine Resident, -[facility]

## 2025-03-05 LAB — BACTERIA WND CULT: ABNORMAL

## 2025-05-02 ENCOUNTER — OFFICE VISIT (OUTPATIENT)
Dept: FAMILY MEDICINE | Facility: CLINIC | Age: 40
End: 2025-05-02

## 2025-05-02 ENCOUNTER — CLINICAL SUPPORT (OUTPATIENT)
Dept: FAMILY MEDICINE | Facility: CLINIC | Age: 40
End: 2025-05-02

## 2025-05-02 VITALS
HEART RATE: 90 BPM | SYSTOLIC BLOOD PRESSURE: 121 MMHG | BODY MASS INDEX: 39.99 KG/M2 | OXYGEN SATURATION: 99 % | HEIGHT: 74 IN | WEIGHT: 311.63 LBS | TEMPERATURE: 99 F | DIASTOLIC BLOOD PRESSURE: 82 MMHG

## 2025-05-02 DIAGNOSIS — E11.65 TYPE 2 DIABETES MELLITUS WITH HYPERGLYCEMIA, WITHOUT LONG-TERM CURRENT USE OF INSULIN: Primary | ICD-10-CM

## 2025-05-02 DIAGNOSIS — E78.2 MIXED HYPERLIPIDEMIA: ICD-10-CM

## 2025-05-02 DIAGNOSIS — E11.65 TYPE 2 DIABETES MELLITUS WITH HYPERGLYCEMIA, WITHOUT LONG-TERM CURRENT USE OF INSULIN: ICD-10-CM

## 2025-05-02 LAB
ALBUMIN SERPL-MCNC: 4.1 G/DL (ref 3.5–5)
ALBUMIN/GLOB SERPL: 1.1 RATIO (ref 1.1–2)
ALP SERPL-CCNC: 53 UNIT/L (ref 40–150)
ALT SERPL-CCNC: 21 UNIT/L (ref 0–55)
ANION GAP SERPL CALC-SCNC: 13 MEQ/L
AST SERPL-CCNC: 19 UNIT/L (ref 11–45)
BASOPHILS # BLD AUTO: 0.04 X10(3)/MCL
BASOPHILS NFR BLD AUTO: 0.6 %
BILIRUB SERPL-MCNC: 0.5 MG/DL
BUN SERPL-MCNC: 12.8 MG/DL (ref 8.9–20.6)
CALCIUM SERPL-MCNC: 9.7 MG/DL (ref 8.4–10.2)
CHLORIDE SERPL-SCNC: 106 MMOL/L (ref 98–107)
CHOLEST SERPL-MCNC: 205 MG/DL
CHOLEST/HDLC SERPL: 6 {RATIO} (ref 0–5)
CO2 SERPL-SCNC: 23 MMOL/L (ref 22–29)
CREAT SERPL-MCNC: 0.73 MG/DL (ref 0.72–1.25)
CREAT/UREA NIT SERPL: 18
EOSINOPHIL # BLD AUTO: 0.29 X10(3)/MCL (ref 0–0.9)
EOSINOPHIL NFR BLD AUTO: 4.4 %
ERYTHROCYTE [DISTWIDTH] IN BLOOD BY AUTOMATED COUNT: 13.5 % (ref 11.5–17)
EST. AVERAGE GLUCOSE BLD GHB EST-MCNC: 223.1 MG/DL
GFR SERPLBLD CREATININE-BSD FMLA CKD-EPI: >60 ML/MIN/1.73/M2
GLOBULIN SER-MCNC: 3.8 GM/DL (ref 2.4–3.5)
GLUCOSE SERPL-MCNC: 113 MG/DL (ref 74–100)
HBA1C MFR BLD: 9.4 %
HCT VFR BLD AUTO: 45.4 % (ref 42–52)
HDLC SERPL-MCNC: 35 MG/DL (ref 35–60)
HGB BLD-MCNC: 15.2 G/DL (ref 14–18)
IMM GRANULOCYTES # BLD AUTO: 0.01 X10(3)/MCL (ref 0–0.04)
IMM GRANULOCYTES NFR BLD AUTO: 0.2 %
LDLC SERPL CALC-MCNC: 133 MG/DL (ref 50–140)
LYMPHOCYTES # BLD AUTO: 2.06 X10(3)/MCL (ref 0.6–4.6)
LYMPHOCYTES NFR BLD AUTO: 30.9 %
MCH RBC QN AUTO: 26.3 PG (ref 27–31)
MCHC RBC AUTO-ENTMCNC: 33.5 G/DL (ref 33–36)
MCV RBC AUTO: 78.5 FL (ref 80–94)
MONOCYTES # BLD AUTO: 0.55 X10(3)/MCL (ref 0.1–1.3)
MONOCYTES NFR BLD AUTO: 8.3 %
NEUTROPHILS # BLD AUTO: 3.71 X10(3)/MCL (ref 2.1–9.2)
NEUTROPHILS NFR BLD AUTO: 55.6 %
NRBC BLD AUTO-RTO: 0 %
PLATELET # BLD AUTO: 376 X10(3)/MCL (ref 130–400)
PMV BLD AUTO: 11 FL (ref 7.4–10.4)
POTASSIUM SERPL-SCNC: 3.9 MMOL/L (ref 3.5–5.1)
PROT SERPL-MCNC: 7.9 GM/DL (ref 6.4–8.3)
RBC # BLD AUTO: 5.78 X10(6)/MCL (ref 4.7–6.1)
SODIUM SERPL-SCNC: 142 MMOL/L (ref 136–145)
TRIGL SERPL-MCNC: 185 MG/DL (ref 34–140)
VLDLC SERPL CALC-MCNC: 37 MG/DL
WBC # BLD AUTO: 6.66 X10(3)/MCL (ref 4.5–11.5)

## 2025-05-02 PROCEDURE — 80061 LIPID PANEL: CPT

## 2025-05-02 PROCEDURE — 83036 HEMOGLOBIN GLYCOSYLATED A1C: CPT

## 2025-05-02 PROCEDURE — 80053 COMPREHEN METABOLIC PANEL: CPT

## 2025-05-02 PROCEDURE — 85025 COMPLETE CBC W/AUTO DIFF WBC: CPT

## 2025-05-02 PROCEDURE — 99213 OFFICE O/P EST LOW 20 MIN: CPT | Mod: PBBFAC

## 2025-05-02 NOTE — PROGRESS NOTES
Gennaro Wild is a 39 y.o. male here for a diabetic eye screening with non-dilated fundus photos per Dr. Jeancarlos Abbasi    Patient cooperative?: Yes  Small pupils?: Yes  Last eye exam: unknown    For exam results, see Encounter Report.

## 2025-05-02 NOTE — PROGRESS NOTES
"Family Medicine Clinic Note     Subjective     Patient ID: Gennaro Wild is a 39 y.o. male.    Chief Complaint: Follow-up and Diabetes (New medication seems to be working well. )    HPI  Diabetes Mellitus type 2  CBGs range 140s-160s, highest of 180s  Complaint with insulin, metformin, and jardiance. No longer taking victoza or ozempic  Continues to experience weight loss with exercise and dietary modification  Last A1c of 13    PMHx:  XIOMARA: diagnosed 2017: complaint with CPAP    Current Outpatient Medications   Medication Instructions    atorvastatin (LIPITOR) 10 mg, Oral, Daily    blood sugar diagnostic Strp 1 strip, skin prick, 2 times daily    empagliflozin (JARDIANCE) 25 mg, Oral, Daily    LANTUS SOLOSTAR U-100 INSULIN 25 Units, Subcutaneous, Nightly    metFORMIN (GLUCOPHAGE) 1,000 mg, Oral, 2 times daily    pen needle, diabetic 32 gauge x 5/32" Ndle 1 each, Misc.(Non-Drug; Combo Route), 2 times daily       ROS     Objective     Vitals:    05/02/25 1310   BP: 121/82   Pulse: 90   Temp: 98.6 °F (37 °C)   SpO2: 99%   Weight: (!) 141.3 kg (311 lb 9.6 oz)   Height: 6' 2.02" (1.88 m)        Physical Exam  Vitals reviewed.   Constitutional:       General: He is not in acute distress.  Eyes:      Conjunctiva/sclera: Conjunctivae normal.   Cardiovascular:      Rate and Rhythm: Normal rate and regular rhythm.      Heart sounds: No murmur heard.  Pulmonary:      Effort: Pulmonary effort is normal. No respiratory distress.      Breath sounds: No wheezing.   Abdominal:      General: There is no distension.      Palpations: Abdomen is soft.      Tenderness: There is no abdominal tenderness.      Comments: Diastasis recti   Musculoskeletal:         General: No tenderness.      Right lower leg: No edema.      Left lower leg: No edema.   Skin:     Findings: No rash.   Neurological:      Mental Status: He is alert and oriented to person, place, and time.          Lab results     Cholesterol Total   Date Value Ref Range " Status   05/02/2025 205 (H) <=200 mg/dL Final     HDL Cholesterol   Date Value Ref Range Status   05/02/2025 35 35 - 60 mg/dL Final     Triglyceride   Date Value Ref Range Status   05/02/2025 185 (H) 34 - 140 mg/dL Final     Lab Results   Component Value Date    WBC 6.66 05/02/2025    HGB 15.2 05/02/2025    HCT 45.4 05/02/2025    MCV 78.5 (L) 05/02/2025     05/02/2025     CMP  Sodium   Date Value Ref Range Status   05/02/2025 142 136 - 145 mmol/L Final     Potassium   Date Value Ref Range Status   05/02/2025 3.9 3.5 - 5.1 mmol/L Final     Chloride   Date Value Ref Range Status   05/02/2025 106 98 - 107 mmol/L Final     CO2   Date Value Ref Range Status   05/02/2025 23 22 - 29 mmol/L Final     Glucose   Date Value Ref Range Status   05/02/2025 113 (H) 74 - 100 mg/dL Final     Blood Urea Nitrogen   Date Value Ref Range Status   05/02/2025 12.8 8.9 - 20.6 mg/dL Final     Creatinine   Date Value Ref Range Status   05/02/2025 0.73 0.72 - 1.25 mg/dL Final     Calcium   Date Value Ref Range Status   05/02/2025 9.7 8.4 - 10.2 mg/dL Final     Protein Total   Date Value Ref Range Status   05/02/2025 7.9 6.4 - 8.3 gm/dL Final     Albumin   Date Value Ref Range Status   05/02/2025 4.1 3.5 - 5.0 g/dL Final     Bilirubin Total   Date Value Ref Range Status   05/02/2025 0.5 <=1.5 mg/dL Final     ALP   Date Value Ref Range Status   05/02/2025 53 40 - 150 unit/L Final     AST   Date Value Ref Range Status   05/02/2025 19 11 - 45 unit/L Final     ALT   Date Value Ref Range Status   05/02/2025 21 0 - 55 unit/L Final     eGFR   Date Value Ref Range Status   05/02/2025 >60 mL/min/1.73/m2 Final     Comment:     Estimated GFR calculated using the CKD-EPI creatinine (2021) equation.     Lab Results   Component Value Date    HGBA1C 9.4 (H) 05/02/2025    HGBA1C 13.0 (H) 12/26/2024    HGBA1C 10.6 (H) 05/31/2024     Lab Results   Component Value Date    CREATININE 0.73 05/02/2025     Lab Results   Component Value Date    HEPCAB  Nonreactive 11/27/2023     Lab Results   Component Value Date    TSH 1.1761 04/30/2021       Assessment and Plan    1. Type 2 diabetes mellitus with hyperglycemia, without long-term current use of insulin (Primary)  A1c improved to 9.4  Start Jardiance 25 mg daily  Encouraged continued lifestyle modification and weight loss  Diabetic eye exam without retinopathy  Diabetic foot exam at next visit    2. Mixed Hyperlipidemia  Improved, triglycerides.  LDL of 133   ASCVD of 5.4%  Recommended lifestyle modification and weight loss     Follow up in about 3 months (around 8/2/2025) for diabetes.    Health Maintenance    CRC Screening (age 45-75): no family hx of colon cancer, Due at age 46  never smoker  Immunizations: informed refusal of tetanus, PNV vaccines today  Occupation: Bar owner  ASCVD: 5.4% risk:     Immunization History   Administered Date(s) Administered    COVID-19, MRNA, LN-S, PF (MODERNA FULL 0.5 ML DOSE) 07/23/2021, 08/27/2021    DTaP 1986, 03/12/1987, 06/01/1987, 06/16/1988, 08/22/1991    HIB 06/23/1988    Hepatitis B, Pediatric/Adolescent 09/11/1998, 10/16/1998, 03/24/1999    IPV 1986, 03/12/1987, 06/16/1988, 08/22/1991    MMR 04/11/1988, 08/22/1991    Meningococcal Conjugate (MCV4P) 02/15/2006    Td (ADULT) 09/11/1998     Jeancarlos Abbasi DO  [facility] Family Medicine Resident, [facility]